# Patient Record
Sex: MALE | Race: WHITE | NOT HISPANIC OR LATINO | Employment: FULL TIME | URBAN - METROPOLITAN AREA
[De-identification: names, ages, dates, MRNs, and addresses within clinical notes are randomized per-mention and may not be internally consistent; named-entity substitution may affect disease eponyms.]

---

## 2017-03-28 ENCOUNTER — HOSPITAL ENCOUNTER (INPATIENT)
Facility: CLINIC | Age: 37
LOS: 2 days | Discharge: HOME | DRG: 603 | End: 2017-03-31
Attending: PHYSICIAN ASSISTANT | Admitting: INTERNAL MEDICINE
Payer: COMMERCIAL

## 2017-03-28 ENCOUNTER — APPOINTMENT (OUTPATIENT)
Dept: CT IMAGING | Facility: CLINIC | Age: 37
DRG: 603 | End: 2017-03-28
Attending: PHYSICIAN ASSISTANT
Payer: COMMERCIAL

## 2017-03-28 DIAGNOSIS — L03.115 CELLULITIS OF RIGHT LOWER EXTREMITY: ICD-10-CM

## 2017-03-28 DIAGNOSIS — R23.8 SKIN BREAKDOWN: ICD-10-CM

## 2017-03-28 DIAGNOSIS — B37.2 CANDIDIASIS OF SKIN: Primary | ICD-10-CM

## 2017-03-28 LAB
ALBUMIN SERPL-MCNC: 2.7 G/DL (ref 3.4–5)
ALP SERPL-CCNC: 89 U/L (ref 40–150)
ALT SERPL W P-5'-P-CCNC: 137 U/L (ref 0–70)
ANION GAP SERPL CALCULATED.3IONS-SCNC: 12 MMOL/L (ref 3–14)
AST SERPL W P-5'-P-CCNC: 286 U/L (ref 0–45)
BASOPHILS # BLD AUTO: 0 10E9/L (ref 0–0.2)
BASOPHILS NFR BLD AUTO: 0.1 %
BILIRUB SERPL-MCNC: 0.8 MG/DL (ref 0.2–1.3)
BUN SERPL-MCNC: 40 MG/DL (ref 7–30)
CALCIUM SERPL-MCNC: 8.9 MG/DL (ref 8.5–10.1)
CHLORIDE SERPL-SCNC: 97 MMOL/L (ref 94–109)
CO2 BLDCOV-SCNC: 22 MMOL/L (ref 21–28)
CO2 SERPL-SCNC: 23 MMOL/L (ref 20–32)
CREAT SERPL-MCNC: 1.89 MG/DL (ref 0.66–1.25)
DIFFERENTIAL METHOD BLD: ABNORMAL
EOSINOPHIL # BLD AUTO: 0 10E9/L (ref 0–0.7)
EOSINOPHIL NFR BLD AUTO: 0 %
ERYTHROCYTE [DISTWIDTH] IN BLOOD BY AUTOMATED COUNT: 16.9 % (ref 10–15)
GFR SERPL CREATININE-BSD FRML MDRD: 41 ML/MIN/1.7M2
GLUCOSE SERPL-MCNC: 139 MG/DL (ref 70–99)
HCT VFR BLD AUTO: 37.1 % (ref 40–53)
HGB BLD-MCNC: 12.6 G/DL (ref 13.3–17.7)
IMM GRANULOCYTES # BLD: 0.2 10E9/L (ref 0–0.4)
IMM GRANULOCYTES NFR BLD: 1.3 %
LACTATE BLD-SCNC: 1.7 MMOL/L (ref 0.7–2.1)
LYMPHOCYTES # BLD AUTO: 1.8 10E9/L (ref 0.8–5.3)
LYMPHOCYTES NFR BLD AUTO: 10.8 %
MCH RBC QN AUTO: 24.3 PG (ref 26.5–33)
MCHC RBC AUTO-ENTMCNC: 34 G/DL (ref 31.5–36.5)
MCV RBC AUTO: 72 FL (ref 78–100)
MONOCYTES # BLD AUTO: 1.1 10E9/L (ref 0–1.3)
MONOCYTES NFR BLD AUTO: 6.4 %
NEUTROPHILS # BLD AUTO: 13.3 10E9/L (ref 1.6–8.3)
NEUTROPHILS NFR BLD AUTO: 81.4 %
NRBC # BLD AUTO: 0 10*3/UL
NRBC BLD AUTO-RTO: 0 /100
PCO2 BLDV: 40 MM HG (ref 40–50)
PH BLDV: 7.36 PH (ref 7.32–7.43)
PLATELET # BLD AUTO: 173 10E9/L (ref 150–450)
PO2 BLDV: 43 MM HG (ref 25–47)
POTASSIUM SERPL-SCNC: 3.6 MMOL/L (ref 3.4–5.3)
PROT SERPL-MCNC: 8.3 G/DL (ref 6.8–8.8)
RBC # BLD AUTO: 5.19 10E12/L (ref 4.4–5.9)
SAO2 % BLDV FROM PO2: 77 %
SODIUM SERPL-SCNC: 132 MMOL/L (ref 133–144)
WBC # BLD AUTO: 16.3 10E9/L (ref 4–11)

## 2017-03-28 PROCEDURE — 83605 ASSAY OF LACTIC ACID: CPT

## 2017-03-28 PROCEDURE — 73700 CT LOWER EXTREMITY W/O DYE: CPT | Mod: RT

## 2017-03-28 PROCEDURE — 87186 SC STD MICRODIL/AGAR DIL: CPT | Performed by: PHYSICIAN ASSISTANT

## 2017-03-28 PROCEDURE — 82803 BLOOD GASES ANY COMBINATION: CPT

## 2017-03-28 PROCEDURE — 99285 EMERGENCY DEPT VISIT HI MDM: CPT | Mod: 25

## 2017-03-28 PROCEDURE — 87070 CULTURE OTHR SPECIMN AEROBIC: CPT | Performed by: PHYSICIAN ASSISTANT

## 2017-03-28 PROCEDURE — 96374 THER/PROPH/DIAG INJ IV PUSH: CPT

## 2017-03-28 PROCEDURE — 36415 COLL VENOUS BLD VENIPUNCTURE: CPT

## 2017-03-28 PROCEDURE — 85025 COMPLETE CBC W/AUTO DIFF WBC: CPT | Performed by: PHYSICIAN ASSISTANT

## 2017-03-28 PROCEDURE — 25000128 H RX IP 250 OP 636: Performed by: PHYSICIAN ASSISTANT

## 2017-03-28 PROCEDURE — 87077 CULTURE AEROBIC IDENTIFY: CPT | Performed by: PHYSICIAN ASSISTANT

## 2017-03-28 PROCEDURE — 80053 COMPREHEN METABOLIC PANEL: CPT | Performed by: PHYSICIAN ASSISTANT

## 2017-03-28 RX ORDER — PIPERACILLIN SODIUM, TAZOBACTAM SODIUM 3; .375 G/15ML; G/15ML
3.38 INJECTION, POWDER, LYOPHILIZED, FOR SOLUTION INTRAVENOUS ONCE
Status: COMPLETED | OUTPATIENT
Start: 2017-03-28 | End: 2017-03-29

## 2017-03-28 RX ADMIN — SODIUM CHLORIDE 1000 ML: 9 INJECTION, SOLUTION INTRAVENOUS at 22:42

## 2017-03-28 ASSESSMENT — ENCOUNTER SYMPTOMS: COLOR CHANGE: 1

## 2017-03-28 NOTE — IP AVS SNAPSHOT
[facility] [address] [facility]    [address] [place] [address]    Phone:  [phone]                                       After Visit Summary   3/28/2017    Drew Braun    MRN: [id number]           After Visit Summary Signature Page     I have received my discharge instructions, and my questions have been answered. I have discussed any challenges I see with this plan with the nurse or doctor.    ..........................................................................................................................................  Patient/Patient Representative Signature      ..........................................................................................................................................  Patient Representative Print Name and Relationship to Patient    ..................................................               ................................................  Date                                            Time    ..........................................................................................................................................  Reviewed by Signature/Title    ...................................................              ..............................................  Date                                                            Time

## 2017-03-28 NOTE — IP AVS SNAPSHOT
MRN:[id number]                      After Visit Summary   3/28/2017    Drew Braun    MRN: [id number]           Thank you!     Thank you for choosing [facility] for your care. Our goal is always to provide you with excellent care. Hearing back from our patients is one way we can continue to improve our services. Please take a few minutes to complete the written survey that you may receive in the mail after you visit with us. Thank you!        Patient Information     Date Of Birth          1980        About your hospital stay     You were admitted on:  March 29, 2017 You last received care in the:  [facility] [address] [facility]    You were discharged on:  March 31, 2017        Reason for your hospital stay       Further management of right leg skin breakdown and infection.                  Who to Call     For medical emergencies, please call 911.  For non-urgent questions about your medical care, please call your primary care provider or clinic, None          Attending Provider     Provider Specialty    London Mckinley MD Emergency Medicine    Manuela, Cindy Avalos PA-C Physician Assistant    Ryan Raygoza DO Internal Medicine       Primary Care Provider    None       No address on file        After Care Instructions     Activity       Your activity upon discharge: activity as tolerated            Diet       Follow this diet upon discharge: Low fat diet.            Wound care and dressings       Instructions to care for your wound at home: as directed by wound consult on this admission.                  Follow-up Appointments     Follow-up and recommended labs and tests        Follow up with PCP as arranged during your stay here. Recommend checking CBC and BMP.  Follow up with wound clinic for ongoing RLE wound cares.                  Your next 10 appointments already scheduled     Apr 06, 2017 11:00 AM CDT   Office Visit with Lucy Stoner MD  "  [facility]    [address] [facility] [address] [phone]           Bring a current list of meds and any records pertaining to this visit.  For Physicals, please bring immunization records and any forms needing to be filled out.  Please arrive 10 minutes early to complete paperwork.            Apr 17, 2017  9:30 AM CDT   New Visit with Tigre Antonio MD   [facility] [place] ([facility])    [address]  [address]  [facility] [id number] [phone]              Further instructions from your care team         Plan of care for wounds located on right lower leg:  Daily, with additional dressing changes as needed if they are loose or soiled:  1.  Remove old dressings and then shower, rinsing leg well.  (If not showering, just cleanse lower leg with soap and water, and go to step 2).   2.  Apply Sween 24 cream to intact and scabby skin on lower legs (daily), and apply Urea cream bilateral plantar feet/heels (twice daily).    3.  Ensure hands are clean, and spray open wounds with MicroKlenz wound cleanser; pat dry with clean gauze.  4.  Apply a thin layer of Silvadene cream to open wounds, and cover with non-stick vaseline gauze.  May be easiest to apply the cream to the dressing, then place it cream-side-down onto wound and smear it around a little to cover all open areas.  Currently it takes about 1/2 piece of the non-stick dressing on the front wound, and at least one full dressing cut in half and placed side by side on the back wound.  As wounds heal/dry up you should need less and less.   5.  Cover with absorbent dressings (ie. ABD pads, peripads, gauze, small diapers - whatever works and is available).   6.  Secure with roll gauze and/or 6\" ACE wrap.  If ACE wrap is used, monitor it throughout the day to ensure it is not bunching up or causing any problems.   7.  Elevate leg as much as " "possible.    *Follow-up at [facility] [place] in next couple of weeks.  Phone: [phone].     *Follow-up sooner (with your new primary doctor or at urgent care) if any acute signs of infection, ie. Increased pain, redness, swelling, odor, gunky drainage, fever, chills, malaise, etc.     Pending Results     Date and Time Order Name Status Description    3/28/2017 2354 Wound Culture Aerobic Bacterial Preliminary     3/28/2017 2340 Blood culture Preliminary     3/28/2017 2340 Blood culture Preliminary             Statement of Approval     Ordered          17 1413  I have reviewed and agree with all the recommendations and orders detailed in this document.  EFFECTIVE NOW     Approved and electronically signed by:  Selwyn Angel MD             Admission Information     Date & Time Provider Department Dept. Phone    3/28/2017 Ryan Raygoza DO [facility] [address] [facility] [phone]      Your Vitals Were     Blood Pressure Pulse Temperature Respirations Height Weight    124/80 (BP Location: Right arm) 80 98.2  F (36.8  C) (Oral) 18 1.93 m (6' 4\") 241.7 kg (532 lb 13.6 oz)    Pulse Oximetry BMI (Body Mass Index)                96% 64.86 kg/m2          [employer] Information     [employer] lets you send messages to your doctor, view your test results, renew your prescriptions, schedule appointments and more. To sign up, go to [email or web address] . Click on \"Log in\" on the left side of the screen, which will take you to the Welcome page. Then click on \"Sign up Now\" on the right side of the page.     You will be asked to enter the access code listed below, as well as some personal information. Please follow the directions to create your username and password.     Your access code is: [email or web address]  Expires: 2017  8:48 AM     Your access code will  in 90 days. If you need help or a new code, please call your [facility] or [phone].        Care EveryWhere " ID     This is your Care EveryWhere ID. This could be used by other organizations to access your [facility] medical records  [id number]           Review of your medicines      START taking        Dose / Directions    amoxicillin-clavulanate 500-125 MG per tablet   Commonly known as:  AUGMENTIN   Used for:  Cellulitis of right lower extremity        Dose:  1 tablet   Take 1 tablet by mouth 2 times daily   Quantity:  20 tablet   Refills:  0       miconazole 2 % powder   Commonly known as:  MICATIN; MICRO GUARD   Used for:  Candidiasis of skin        Apply under pannus after cleaning with soft disposable white washcloths and daphnie cream (NOT cloth washcloths, NOT antonino wipes)   Quantity:  30 g   Refills:  0         CONTINUE these medicines which have NOT CHANGED        Dose / Directions    multivitamin, therapeutic with minerals Tabs tablet        Dose:  1 tablet   Take 1 tablet by mouth daily   Refills:  0            Where to get your medicines      These medications were sent to [facility] - [address]  [address], [place] [address]     Phone:  [phone]     amoxicillin-clavulanate 500-125 MG per tablet    miconazole 2 % powder                Protect others around you: Learn how to safely use, store and throw away your medicines at www.disposemymeds.org.             Medication List: This is a list of all your medications and when to take them. Check marks below indicate your daily home schedule. Keep this list as a reference.      Medications           Morning Afternoon Evening Bedtime As Needed    amoxicillin-clavulanate 500-125 MG per tablet   Commonly known as:  AUGMENTIN   Take 1 tablet by mouth 2 times daily   Last time this was given:  3/31/17                                      miconazole 2 % powder   Commonly known as:  MICATIN; MICRO GUARD   Apply under pannus after cleaning with soft disposable white washcloths and daphnie cream (NOT cloth washcloths, NOT antonino  wipes)   Last time this was given:  3/31/2017  8:15 AM   Next Dose Due:  3/31/17                                         multivitamin, therapeutic with minerals Tabs tablet   Take 1 tablet by mouth daily   Next Dose Due:  3/31/17

## 2017-03-29 PROBLEM — L03.90 CELLULITIS: Status: ACTIVE | Noted: 2017-03-29

## 2017-03-29 LAB
ALBUMIN SERPL-MCNC: 2.4 G/DL (ref 3.4–5)
ALBUMIN UR-MCNC: 100 MG/DL
ALP SERPL-CCNC: 76 U/L (ref 40–150)
ALT SERPL W P-5'-P-CCNC: 108 U/L (ref 0–70)
ANION GAP SERPL CALCULATED.3IONS-SCNC: 12 MMOL/L (ref 3–14)
APPEARANCE UR: ABNORMAL
AST SERPL W P-5'-P-CCNC: 187 U/L (ref 0–45)
BACTERIA #/AREA URNS HPF: ABNORMAL /HPF
BILIRUB SERPL-MCNC: 0.7 MG/DL (ref 0.2–1.3)
BILIRUB UR QL STRIP: NEGATIVE
BUN SERPL-MCNC: 40 MG/DL (ref 7–30)
CALCIUM SERPL-MCNC: 8.3 MG/DL (ref 8.5–10.1)
CHLORIDE SERPL-SCNC: 98 MMOL/L (ref 94–109)
CO2 SERPL-SCNC: 22 MMOL/L (ref 20–32)
COLOR UR AUTO: YELLOW
CREAT SERPL-MCNC: 1.88 MG/DL (ref 0.66–1.25)
ERYTHROCYTE [DISTWIDTH] IN BLOOD BY AUTOMATED COUNT: 17.2 % (ref 10–15)
GFR SERPL CREATININE-BSD FRML MDRD: 41 ML/MIN/1.7M2
GLUCOSE BLDC GLUCOMTR-MCNC: 126 MG/DL (ref 70–99)
GLUCOSE BLDC GLUCOMTR-MCNC: 128 MG/DL (ref 70–99)
GLUCOSE BLDC GLUCOMTR-MCNC: 129 MG/DL (ref 70–99)
GLUCOSE BLDC GLUCOMTR-MCNC: 131 MG/DL (ref 70–99)
GLUCOSE SERPL-MCNC: 118 MG/DL (ref 70–99)
GLUCOSE UR STRIP-MCNC: NEGATIVE MG/DL
HBA1C MFR BLD: NORMAL % (ref 4.3–6)
HCT VFR BLD AUTO: 32.6 % (ref 40–53)
HGB BLD-MCNC: 10.9 G/DL (ref 13.3–17.7)
HGB UR QL STRIP: ABNORMAL
HYALINE CASTS #/AREA URNS LPF: 1 /LPF (ref 0–2)
KETONES UR STRIP-MCNC: NEGATIVE MG/DL
LEUKOCYTE ESTERASE UR QL STRIP: ABNORMAL
MCH RBC QN AUTO: 24 PG (ref 26.5–33)
MCHC RBC AUTO-ENTMCNC: 33.4 G/DL (ref 31.5–36.5)
MCV RBC AUTO: 72 FL (ref 78–100)
MUCOUS THREADS #/AREA URNS LPF: PRESENT /LPF
NITRATE UR QL: NEGATIVE
PH UR STRIP: 5.5 PH (ref 5–7)
PLATELET # BLD AUTO: 146 10E9/L (ref 150–450)
POTASSIUM SERPL-SCNC: 3.2 MMOL/L (ref 3.4–5.3)
PROT SERPL-MCNC: 7.3 G/DL (ref 6.8–8.8)
RBC # BLD AUTO: 4.55 10E12/L (ref 4.4–5.9)
RBC #/AREA URNS AUTO: 2 /HPF (ref 0–2)
SODIUM SERPL-SCNC: 132 MMOL/L (ref 133–144)
SP GR UR STRIP: 1.02 (ref 1–1.03)
SQUAMOUS #/AREA URNS AUTO: <1 /HPF (ref 0–1)
URN SPEC COLLECT METH UR: ABNORMAL
UROBILINOGEN UR STRIP-MCNC: 2 MG/DL (ref 0–2)
WBC # BLD AUTO: 14.5 10E9/L (ref 4–11)
WBC #/AREA URNS AUTO: 14 /HPF (ref 0–2)

## 2017-03-29 PROCEDURE — 80053 COMPREHEN METABOLIC PANEL: CPT | Performed by: INTERNAL MEDICINE

## 2017-03-29 PROCEDURE — 85027 COMPLETE CBC AUTOMATED: CPT | Performed by: INTERNAL MEDICINE

## 2017-03-29 PROCEDURE — 99254 IP/OBS CNSLTJ NEW/EST MOD 60: CPT | Performed by: SURGERY

## 2017-03-29 PROCEDURE — 99213 OFFICE O/P EST LOW 20 MIN: CPT

## 2017-03-29 PROCEDURE — 36415 COLL VENOUS BLD VENIPUNCTURE: CPT | Performed by: INTERNAL MEDICINE

## 2017-03-29 PROCEDURE — 81001 URINALYSIS AUTO W/SCOPE: CPT | Performed by: INTERNAL MEDICINE

## 2017-03-29 PROCEDURE — 12000000 ZZH R&B MED SURG/OB

## 2017-03-29 PROCEDURE — 87086 URINE CULTURE/COLONY COUNT: CPT | Performed by: INTERNAL MEDICINE

## 2017-03-29 PROCEDURE — 99223 1ST HOSP IP/OBS HIGH 75: CPT | Mod: AI | Performed by: INTERNAL MEDICINE

## 2017-03-29 PROCEDURE — 99207 ZZC APP CREDIT; MD BILLING SHARED VISIT: CPT | Performed by: INTERNAL MEDICINE

## 2017-03-29 PROCEDURE — 00000146 ZZHCL STATISTIC GLUCOSE BY METER IP

## 2017-03-29 PROCEDURE — 83036 HEMOGLOBIN GLYCOSYLATED A1C: CPT | Performed by: INTERNAL MEDICINE

## 2017-03-29 PROCEDURE — 25000128 H RX IP 250 OP 636: Performed by: PHYSICIAN ASSISTANT

## 2017-03-29 PROCEDURE — 25000132 ZZH RX MED GY IP 250 OP 250 PS 637: Performed by: INTERNAL MEDICINE

## 2017-03-29 PROCEDURE — 25000128 H RX IP 250 OP 636: Performed by: INTERNAL MEDICINE

## 2017-03-29 PROCEDURE — 87040 BLOOD CULTURE FOR BACTERIA: CPT | Performed by: PHYSICIAN ASSISTANT

## 2017-03-29 RX ORDER — HYDROMORPHONE HYDROCHLORIDE 1 MG/ML
.3-.5 INJECTION, SOLUTION INTRAMUSCULAR; INTRAVENOUS; SUBCUTANEOUS
Status: DISCONTINUED | OUTPATIENT
Start: 2017-03-29 | End: 2017-03-30

## 2017-03-29 RX ORDER — POTASSIUM CHLORIDE 1500 MG/1
40 TABLET, EXTENDED RELEASE ORAL ONCE
Status: COMPLETED | OUTPATIENT
Start: 2017-03-29 | End: 2017-03-29

## 2017-03-29 RX ORDER — PROCHLORPERAZINE 25 MG
25 SUPPOSITORY, RECTAL RECTAL EVERY 12 HOURS PRN
Status: DISCONTINUED | OUTPATIENT
Start: 2017-03-29 | End: 2017-03-31 | Stop reason: HOSPADM

## 2017-03-29 RX ORDER — NICOTINE POLACRILEX 4 MG
15-30 LOZENGE BUCCAL
Status: DISCONTINUED | OUTPATIENT
Start: 2017-03-29 | End: 2017-03-31 | Stop reason: HOSPADM

## 2017-03-29 RX ORDER — ONDANSETRON 2 MG/ML
4 INJECTION INTRAMUSCULAR; INTRAVENOUS EVERY 6 HOURS PRN
Status: DISCONTINUED | OUTPATIENT
Start: 2017-03-29 | End: 2017-03-31 | Stop reason: HOSPADM

## 2017-03-29 RX ORDER — DEXTROSE MONOHYDRATE 25 G/50ML
25-50 INJECTION, SOLUTION INTRAVENOUS
Status: DISCONTINUED | OUTPATIENT
Start: 2017-03-29 | End: 2017-03-31 | Stop reason: HOSPADM

## 2017-03-29 RX ORDER — ONDANSETRON 4 MG/1
4 TABLET, ORALLY DISINTEGRATING ORAL EVERY 6 HOURS PRN
Status: DISCONTINUED | OUTPATIENT
Start: 2017-03-29 | End: 2017-03-31 | Stop reason: HOSPADM

## 2017-03-29 RX ORDER — SODIUM CHLORIDE 9 MG/ML
INJECTION, SOLUTION INTRAVENOUS CONTINUOUS
Status: DISCONTINUED | OUTPATIENT
Start: 2017-03-29 | End: 2017-03-31

## 2017-03-29 RX ORDER — PROCHLORPERAZINE MALEATE 5 MG
5-10 TABLET ORAL EVERY 6 HOURS PRN
Status: DISCONTINUED | OUTPATIENT
Start: 2017-03-29 | End: 2017-03-31 | Stop reason: HOSPADM

## 2017-03-29 RX ORDER — ACETAMINOPHEN 650 MG/1
650 SUPPOSITORY RECTAL EVERY 4 HOURS PRN
Status: DISCONTINUED | OUTPATIENT
Start: 2017-03-29 | End: 2017-03-31 | Stop reason: HOSPADM

## 2017-03-29 RX ORDER — ACETAMINOPHEN 325 MG/1
650 TABLET ORAL EVERY 4 HOURS PRN
Status: DISCONTINUED | OUTPATIENT
Start: 2017-03-29 | End: 2017-03-31 | Stop reason: HOSPADM

## 2017-03-29 RX ORDER — BISACODYL 10 MG
10 SUPPOSITORY, RECTAL RECTAL DAILY PRN
Status: DISCONTINUED | OUTPATIENT
Start: 2017-03-29 | End: 2017-03-31 | Stop reason: HOSPADM

## 2017-03-29 RX ORDER — HYDROCODONE BITARTRATE AND ACETAMINOPHEN 5; 325 MG/1; MG/1
1-2 TABLET ORAL EVERY 4 HOURS PRN
Status: DISCONTINUED | OUTPATIENT
Start: 2017-03-29 | End: 2017-03-31 | Stop reason: HOSPADM

## 2017-03-29 RX ORDER — AMOXICILLIN 250 MG
1-2 CAPSULE ORAL 2 TIMES DAILY PRN
Status: DISCONTINUED | OUTPATIENT
Start: 2017-03-29 | End: 2017-03-31 | Stop reason: HOSPADM

## 2017-03-29 RX ORDER — PIPERACILLIN SODIUM, TAZOBACTAM SODIUM 3; .375 G/15ML; G/15ML
3.38 INJECTION, POWDER, LYOPHILIZED, FOR SOLUTION INTRAVENOUS EVERY 6 HOURS
Status: DISCONTINUED | OUTPATIENT
Start: 2017-03-29 | End: 2017-03-31 | Stop reason: HOSPADM

## 2017-03-29 RX ORDER — MULTIPLE VITAMINS W/ MINERALS TAB 9MG-400MCG
1 TAB ORAL DAILY
COMMUNITY

## 2017-03-29 RX ORDER — NALOXONE HYDROCHLORIDE 0.4 MG/ML
.1-.4 INJECTION, SOLUTION INTRAMUSCULAR; INTRAVENOUS; SUBCUTANEOUS
Status: DISCONTINUED | OUTPATIENT
Start: 2017-03-29 | End: 2017-03-31 | Stop reason: HOSPADM

## 2017-03-29 RX ORDER — UREA 8.5 G/85G
CREAM TOPICAL 2 TIMES DAILY PRN
Status: DISCONTINUED | OUTPATIENT
Start: 2017-03-29 | End: 2017-03-31 | Stop reason: HOSPADM

## 2017-03-29 RX ADMIN — MICONAZOLE NITRATE: 2 POWDER TOPICAL at 14:13

## 2017-03-29 RX ADMIN — MICONAZOLE NITRATE: 2 POWDER TOPICAL at 19:16

## 2017-03-29 RX ADMIN — PIPERACILLIN SODIUM,TAZOBACTAM SODIUM 3.38 G: 3; .375 INJECTION, POWDER, FOR SOLUTION INTRAVENOUS at 08:23

## 2017-03-29 RX ADMIN — MICONAZOLE NITRATE: 2 POWDER TOPICAL at 23:09

## 2017-03-29 RX ADMIN — MICONAZOLE NITRATE: 57 CREAM TOPICAL at 23:09

## 2017-03-29 RX ADMIN — MICONAZOLE NITRATE: 57 CREAM TOPICAL at 14:13

## 2017-03-29 RX ADMIN — POTASSIUM CHLORIDE 40 MEQ: 1500 TABLET, EXTENDED RELEASE ORAL at 09:51

## 2017-03-29 RX ADMIN — PIPERACILLIN SODIUM,TAZOBACTAM SODIUM 3.38 G: 3; .375 INJECTION, POWDER, FOR SOLUTION INTRAVENOUS at 19:19

## 2017-03-29 RX ADMIN — SODIUM CHLORIDE: 9 INJECTION, SOLUTION INTRAVENOUS at 02:28

## 2017-03-29 RX ADMIN — PIPERACILLIN SODIUM,TAZOBACTAM SODIUM 3.38 G: 3; .375 INJECTION, POWDER, FOR SOLUTION INTRAVENOUS at 01:22

## 2017-03-29 RX ADMIN — PIPERACILLIN SODIUM,TAZOBACTAM SODIUM 3.38 G: 3; .375 INJECTION, POWDER, FOR SOLUTION INTRAVENOUS at 14:17

## 2017-03-29 NOTE — CONSULTS
"[facility] General Surgery Consultation    Drew Braun MRN# [id number]   YOB: 1980 Age: 36 year old      Date of Admission:  3/28/2017  Date of Consult: 3/29/2017         Assessment and Plan:   Patient is a 36 year old male with severe cellulitis of his right lower extremity. He had a CT which does not show evidence of abscess or undrained fluid collection. The area on the anterior lower leg has patchy superficial necrosis of the skin which will slough. At this time no indication for operative debridement and would benefit from ongoing local wound cares.     PLAN:  Agree with consult WO  Aggressive local wound cares  Surgery will follow closely along to evaluate for abscess formation         Requesting Physician:      Dr. Raygoza        Chief Complaint:     Chief Complaint   Patient presents with     Leg Swelling     patient here with righ leg redness and swelling started yesterday          History of Present Illness:   Drew Braun is a 36 year old male who was seen in consultation at the request of Dr. Raygoza who presented with right lower leg pain, swelling and drainage. He reports that for the past day he has had increasing redness over his right lower leg. He also has had drainage and weeping of the skin on the anterior leg and posterior leg.  He has chronic lower leg edema. He has had prior RLE cellulitis which was treated as an outpatient with abx. He denies fevers, though his leg feels warm.           Physical Exam:   Blood pressure 108/64, pulse 88, temperature 98.1  F (36.7  C), temperature source Oral, resp. rate 21, height 6' 4\" (1.93 m), weight (!) 532 lb 13.6 oz (241.7 kg), SpO2 96 %.  532 lbs 13.63 oz  General: alert, no distress  Psych: Alert and Oriented.  Normal affect  Neurological: grossly intact  Eyes: Sclera clear  Respiratory:  nonlabored breathing  Cardiovascular:  Regular Rate and Rhythm   GI: soft, nt, nd   Lymphatic/Hematologic/Immune:  " No femoral or cervical lymphadenopathy.  Integumentary:  RLE: significant erythema and induration below the knee extending to above ankle. Area on anterior lower leg which measures approximately 12cm x10cm with patchy superficial skin necrosis and weeping with white exudate. No fluctuance but tissue macerated and edematous.  Area on posterior lower leg with smaller areas of superficial patchy necrosis, white exudate         Past Medical History:   History reviewed. No pertinent past medical history.         Past Surgical History:   History reviewed. No pertinent surgical history.         Current Medications:           piperacillin-tazobactam  3.375 g Intravenous Q6H     insulin aspart  1-7 Units Subcutaneous TID AC     insulin aspart  1-5 Units Subcutaneous At Bedtime     influenza quadrivalent (PF) vacc age 3 yrs and older  0.5 mL Intramuscular Prior to discharge     miconazole   Topical TID       naloxone, acetaminophen, acetaminophen, HYDROcodone-acetaminophen, HYDROmorphone, senna-docusate, magnesium hydroxide, bisacodyl, ondansetron **OR** ondansetron, prochlorperazine **OR** prochlorperazine **OR** prochlorperazine, glucose **OR** dextrose **OR** glucagon         Home Medications:     Prior to Admission medications    Medication Sig Last Dose Taking? Auth Provider   multivitamin, therapeutic with minerals (THERA-VIT-M) TABS tablet Take 1 tablet by mouth daily Past Week at Unknown time Yes Unknown, Entered By History            Allergies:   No Known Allergies         Family History:   reviewed        Social History:   Drew Braun  reports that he has never smoked. He does not have any smokeless tobacco history on file.          Review of Systems:   The 10 point Review of Systems is negative other than noted in the HPI.         Labs/Imaging   All new lab and imaging data was reviewed.   I have personally reviewed the imaging studies  .  TIME SPENT:  40 minutes was spent with patient and greater than  50% of the time was spent counseling and coordination of care.      Fior Leahy MD

## 2017-03-29 NOTE — ED PROVIDER NOTES
History     Chief Complaint:  Leg Swelling     HPI   Drew Braun is a 36 year old male who presents with right leg swelling and redness. The patient reports he has been having some ongoing swelling in the right leg, but yesterday he noticed new redness. The redness has been growing progressively worse. In addition, he has rashes along his right thigh and abdomen. Also has bruises along both arms, which he attributes to bumping into things as he has been getting over a recent cold. No recent travel.    Allergies:  The patient has no known drug allergies.    Medications:    The patient is currently on no regular medications.    Past Medical History:    History reviewed.  No significant past medical history.      Past Surgical History:    History reviewed.  No significant past surgical history.     Family History:    History reviewed.  No significant family history.    Social History:  Relationship status: Single  Tobacco use: Negative  The patient presents alone.     Review of Systems   Cardiovascular: Positive for leg swelling.   Skin: Positive for color change and rash.   All other systems reviewed and are negative.      Physical Exam   First Vitals:  BP: 142/80  Pulse: 103  Temp: 98  F (36.7  C)  Resp: 26  SpO2: 94 %    Physical Exam  General: Alert, interactive, morbidly obese male appears comfortable    Eye:  Pupils are equal, round, and reactive.     ENT:     No rhinorrhea.     Moist mucus membranes.    Neck: No rigidity.               Cardiac:  Regular rate and rhythm. S1, S2.  No murmurs, gallops, or rubs. Palpable right pedal pulse.     Pulmonary:  Clear to auscultation bilaterally.  No wheezes or crackles.             Non labored. No use of accessory muscles.     Musculoskeletal:  Freely moveable extremities    Skin:  Warm and dry. Bilateral lower legs have hyperpigmentation which appears chronic. Right lower leg appears twice the size and has weeping shin wound and posterior calf wound; there  is increased deep erythema of the right lower leg compared to left; no streaking up the leg, no creptius. Pannus fold and bilateral groins are brightly erythematous, moist and odorous with skin breakdown.     Neurologic:  Non-focal exam without asymmetric weakness or numbness.  GCS 15    Psychiatric:  Awake. Normal affect with appropriate interaction with examiner. The pt does not appear in pain.      Emergency Department Course     Imaging:  Radiographic findings were communicated with the patient who voiced understanding of the findings.    CT Tibia Fibula Right w/o Contrast  1. Nonspecific prominent edema within the fat of a swollen right lower leg.  2. No soft tissue gas or underlying fluid collection is visualized.   3. Several nonspecific punctate calcifications in the deep soft tissues of the anterior lower leg.      Laboratory:  2257: ISTAT gases lactate claudia POCT: pH 7.36, pCO2 40, pO2 43, HCO3 22, Lactate 1.7  CBC: WBC 16.3 (H), HGB 12.6 (L),   CMP: Na 132 (L), Glucose 139 (H), BUN 40 (H), GFR 41 (L), Albumin 2.7 (L),  (H),  (H), Creatinine 1.89 (H), o/w WNL    Interventions:  2242: Normal Saline, 1000 mL, IV  Zosyn, 3.375 g, IV      Emergency Department Course:  Nursing notes and vitals reviewed.  I performed an exam of the patient as documented above.  The above workup was undertaken.  2347: I rechecked the patient and discussed results.  0023: I discussed the patient with Dr. Raygoza of the hospitalist service.    Findings and plan explained to the Patient who consents to admission. Discussed the patient with Dr. Raygoza, who will admit the patient to a [facility] bed for further monitoring, evaluation, and treatment.       Impression & Plan      Medical Decision Making:  Drew Braun is a 36 year old male here for evaluation of right lower extremity edema,  And suspected infection. Patient is afebrile and well appearing, though he does have a very enlarged red, swollen right lower  leg, concerning for infection. He does have an elevated WBC count, and acute renal insufficiency. He is afebrile, and has normal lactic acid. I attempted to get a wound culture from some fluid draining from open wounds on extremity. I started the patient on zosyn, and felt this was broad spectrum and and good option   in the event there is underlying deep space infection. Given the severity of his symptoms, I opted to do a CT, as I believed this would provide quick and efficient evaluation regarding the depth of his infection, which shows no evidence of gas formation or osteomyelelitis. At this point, I believe patient needs to be admitted for further evaluation and treatment. The patient consents to admission.     Diagnosis:    ICD-10-CM    1. Cellulitis of right lower extremity L03.115    2. Skin breakdown L90.9     of the groin and abdominal fold     Disposition:  Admit to a [facility] bed under the care of Dr. Raygoza.     Andrea WALKER, am serving as a scribe on 3/28/2017 at 10:16 PM to personally document services performed by Cindy Roy PA, based on my observations and the provider's statements to me.     EMERGENCY DEPARTMENT       Cindy Roy PA-C  03/29/17 0125

## 2017-03-29 NOTE — ED NOTES
Bed: ED20  Expected date:   Expected time:   Means of arrival:   Comments:  Housekeeping clean- enteric iso

## 2017-03-29 NOTE — PHARMACY-ADMISSION MEDICATION HISTORY
Admission medication history interview status for the 3/28/2017  admission is complete. See EPIC admission navigator for prior to admission medications     Medication history source reliability:Good    Actions taken by pharmacist (provider contacted, etc):None     Additional medication history information not noted on PTA med list :None    Medication reconciliation/reorder completed by provider prior to medication history? No    Time spent in this activity: 5 minutes    Prior to Admission medications    Medication Sig Last Dose Taking? Auth Provider   multivitamin, therapeutic with minerals (THERA-VIT-M) TABS tablet Take 1 tablet by mouth daily Past Week at Unknown time Yes Unknown, Entered By History       Radha Laughlin, PharmD

## 2017-03-29 NOTE — PROGRESS NOTES
[facility]    Hospitalist Progress Note    Date of Service (when I saw the patient): 03/29/2017    Assessment & Plan   Mr. Braun is a 36-year-old male with a past medical history of morbid obesity who presents to the Emergency Department with concerns for right lower extremity swelling and cellulitis.     1. Right lower extremity cellulitis: The patient has a history of methicillin-sensitive Staphylococcus aureus back in 2015. A CT does not show any evidence of abscess or gas bubbles to suggest necrosis or gangrene.  - Zosyn (day 2)  -Bld cx pending, wound cx growing gram positive and gram negative organisms  - Surgery consult appreciated, no intervention  - Wound RN consult appreciated    2. Morbid obesity: BMI 65.  - Needs outpatient follow up for weight loss  - PT     3. Fungal infection of pannus:  - Appreciate wound consult, started on miconazole TID    4. LAURO likely from #1: The patient's admit creatinine is 1.89 and the last creatinine I have access to was normal at 0.9 in 2013.   - UA questionable for UTI, Ucx pending  - Zosyn as above  - IVF NS at 100/hr, recheck labs in AM    5. Mildly elevated ALT and AST: Unclear etiology and transaminase was normal about a decade ago, possibly related to fatty liver disease given morbid obesity. He is asymptomatic, no evidence of obstruction with normal alkaline phosphatase and bilirubin. He states only having 1-2 alcoholic beverages every few weeks.  - down trending, recheck in AM    6. Hypokalemia.  - replaced oral x 1 dose, recheck in AM    DVT Prophylaxis: Ambulate every shift  Code Status: Full Code    Disposition: Expected discharge in 1-2 days.    Selwyn Angel MD    Interval History   No new complaints. No fevers/chills/pain in RLE. Denies concerns for home safety despite living alone and admitting to occasional leg trauma. Denies significant alcohol use as outlined above.    -Data reviewed today: I reviewed all new labs and imaging  results over the last 24 hours. I personally reviewed no images or EKG's today.    Physical Exam   Temp: 98.1  F (36.7  C) Temp src: Oral BP: 108/64 Pulse: 88 Heart Rate: 95 Resp: 21 SpO2: 96 % O2 Device: None (Room air)    Vitals:    03/29/17 0145   Weight: (!) 241.7 kg (532 lb 13.6 oz)     Vital Signs with Ranges  Temp:  [98  F (36.7  C)-98.5  F (36.9  C)] 98.1  F (36.7  C)  Pulse:  [] 88  Heart Rate:  [] 95  Resp:  [20-26] 21  BP: (108-142)/(61-80) 108/64  SpO2:  [94 %-98 %] 96 %  I/O last 3 completed shifts:  In: 459 [I.V.:459]  Out: 500 [Urine:500]    Constitutional: Awake, alert, cooperative, no apparent distress, morbidly obese  Respiratory: Clear to auscultation bilaterally, no crackles or wheezing  Cardiovascular: Regular rate and rhythm, normal S1 and S2, and no murmur noted, distant sounds  GI: Normal bowel sounds, round abdomen, soft, non-distended, non-tender but difficult exam given habitus.  Skin/Integumen: Anterior and posterior tibial skin breakdown/slough with yellow drainage. Erythema extending up to knee, demarcated today with marker. Warmth appreciated up to medial thigh.    Medications     NaCl 100 mL/hr at 03/29/17 0228       piperacillin-tazobactam  3.375 g Intravenous Q6H     insulin aspart  1-7 Units Subcutaneous TID AC     insulin aspart  1-5 Units Subcutaneous At Bedtime     influenza quadrivalent (PF) vacc age 3 yrs and older  0.5 mL Intramuscular Prior to discharge     miconazole   Topical TID       Data     Recent Labs  Lab 03/29/17  0800 03/28/17  2245   WBC 14.5* 16.3*   HGB 10.9* 12.6*   MCV 72* 72*   * 173   * 132*   POTASSIUM 3.2* 3.6   CHLORIDE 98 97   CO2 22 23   BUN 40* 40*   CR 1.88* 1.89*   ANIONGAP 12 12   DEBI 8.3* 8.9   * 139*   ALBUMIN 2.4* 2.7*   PROTTOTAL 7.3 8.3   BILITOTAL 0.7 0.8   ALKPHOS 76 89   * 137*   * 286*       Recent Results (from the past 24 hour(s))   CT Tibia Fibula Right w/o Contrast    Narrative    CT TIBIA  FIBULA RIGHT WITHOUT CONTRAST March 28, 2017 11:33 PM     HISTORY: Leg swelling and concern for deep space infection.    TECHNIQUE: Axial images with reconstructions. Radiation dose for this  scan was reduced using automated exposure control, adjustment of the  mA and/or kV according to patient size, or iterative reconstruction  technique.      Preliminary report given by Dr. Hawkins at 2346 hours.      Impression    IMPRESSION: There is prominent subcutaneous and cutaneous edema and  thickening extending along the leg and ankle. This is nonspecific and  could relate to reactive or dependent edema, injury, or cellulitis.  There are some varicose veins. There is some scattered punctate  anterior subcutaneous calcifications. These are also present minimally  elsewhere and are nonspecific (possibly dystrophic calcification). No  focal intermuscular fluid collection is appreciated. No soft tissue  gas identified.

## 2017-03-29 NOTE — H&P
DATE OF ADMISSION:  03/29/2017.      PRIMARY CARE PHYSICIAN:  None currently.      CODE STATUS:  Full code.      CHIEF COMPLAINT:  Right lower extremity swelling and pain.      HISTORY OF PRESENT ILLNESS:  Mr. Drew Braun is a 36-year-old male without any significant past medical history who presents to the Emergency Department with the above concern.  History is obtained through discussion with the ED physician as well as the patient.  I also obtained the records from [facility] through Care Everywhere which I reviewed.  The patient notes that he has been having worsening right lower extremity swelling, pain and erythema over the past day.  The patient has had swelling dating back at least 1 year and states that he went to see a physician who told him that he had edema and that he should elevate his legs.  The swelling has been more chronic, though progressive somewhat recently.  In particular, the patient started developing more erythema and warmth over just the past 24 hours with some associated weeping and drainage.  He came in for evaluation today given that it was worsening.  He denies any substantial pain and states that otherwise feels normal.  He has not had any systemic symptoms of fevers or chills.  No chest discomfort, shortness of breath, abdominal pain.  He denies any trauma.  No areas of fluctuance that he is aware.  Left leg is normal.      I reviewed records through Care Everywhere and note that he presented to urgent care at the [facility] in 2015 where he was diagnosed with a right lower extremity cellulitis.  At that point, the wound culture came back with MSSA.  He had been prescribed Bactrim.  The patient does not recall necessarily having an infection.  No history of MRSA that I am seeing.  In the Emergency Department, the patient had a CT of the leg which showed evidence of edema but no evidence of gas or fluid collection.  He has been initiated on Zosyn.  Wound culture and  blood culture have been obtained.      PAST MEDICAL HISTORY:  Right lower extremity cellulitis in 2015 with MSSA.      MEDICATIONS:  The patient declines taking any regular medications.      SOCIAL HISTORY:  The patient works for [employer].  Denies any use of tobacco and rare alcohol use.      FAMILY HISTORY:  Both his parents are alive and healthy.      ALLERGIES:  No known drug allergies.      REVIEW OF SYSTEMS:  The complete review of systems reviewed and negative, save for the pertinent positives which are recorded in the HPI.      PHYSICAL EXAMINATION:   VITAL SIGNS:  Show blood pressure of 120/61, heart rate 102, respirations 22, satting 98% on room air, temperature of 98.5 degrees Fahrenheit.   GENERAL:  The patient is lying in bed and appears comfortable.  He is not ill-appearing.   HEENT:  Pupils equal, round, reactive to light, extraocular muscle function is intact.  No scleral icterus.  Oropharynx is clear.   NECK:  No lymphadenopathy or thyromegaly.   CARDIOVASCULAR:  Heart is regular rate and rhythm without any murmur, rub or gallop.   PULMONARY:  Lungs are clear to auscultation bilaterally without wheeze or rhonchi.   GASTROINTESTINAL:  Positive bowel sounds, soft, nontender and nondistended.   SKIN:  He has a fair amount of erythema on the right lower extremity with some areas of darkening surrounded by what to me feels like a bit of fluctuance with some serosanguineous drainage.  No definitive abscess and no purulent material is noted.  The overlying area is nontender to palpation.   PSYCHIATRIC:  Alert and oriented x3, normal affect.   NEUROLOGIC:  Cranial nerves II-XII are grossly intact without any focal deficits.      LABORATORY DATA:  WBC count 16.3, hemoglobin 12.6 and platelets of 173.  He does have a left shift.  Sodium 132, potassium 3.6, chloride 97, CO2 of 23, BUN 40, creatinine 1.89 with elevated ALT at 137 and AST at 286.  Lactic acid is normal at 1.7.  Glucose is 139.  Blood  cultures and wound cultures pending.      IMAGING:  CT of the tib-fib shows a preliminary report of edema within the fat of a swollen right lower leg with no soft tissue gas or fluid collection seen.      ASSESSMENT AND PLAN:  Mr. Mcfadden is a 36-year-old male with a past medical history of morbid obesity who presents to the Emergency Department with concerns for right lower extremity swelling and cellulitis.   1.  Right lower extremity cellulitis:  The patient has a history of methicillin-sensitive Staphylococcus aureus back in 2015.  A CT does not show any evidence of abscess or gas bubbles to suggest necrosis or gangrene.  I do feel what to me seems a bit of fluctuance more superficially with some serosanguineous drainage.  For now will continue with Zosyn, which had been initiated in the Emergency Department and will hold on vancomycin.  Will await wound and blood cultures.  I am going to ask Surgery to look at the wound and determine if there is any role for lancing or drainage.   2.  Morbid obesity:  Body mass index is not yet populated but will certainly be over 40.   3.  Skin breakdown and odor beneath his pannus:  I am going to ask the wound nurse to evaluate.   4.  Deep venous thrombosis prophylaxis:  Ambulation.   5.  Elevated creatinine:  The patient's creatinine is 1.89 and the last creatinine I have access to was normal at 0.9 in .  He does have an elevated BUN, so I suspect this is an acute kidney injury.  I am going to hydrate with normal saline, obtain a UA and recheck a renal function in the morning.   6.  Mildly elevated ALT and AST:  Unclear etiology and transaminase was normal about a decade ago.  He is asymptomatic, no evidence of obstruction with normal alkaline phosphatase and bilirubin.  Will recheck.         AINSLEY RUSSELL DO             D: 2017 02:05   T: 2017 03:08   MT: tra      Name:     JOMAR MCFADDEN   MRN:      [id number]        Account:      [id number]   :       1980           Admitted:     [id number]      Document: [id number]

## 2017-03-29 NOTE — PROGRESS NOTES
"Received call from lab:     Hgb A1C was checked as an add-on lab; result was 'below assay range,' meaning it was detected at \"less than 3.5.\"  Can be re-ordered at the discretion of the MD.    Flor FARAH   "

## 2017-03-29 NOTE — PROGRESS NOTES
1) Paged Hospitalist (Dr. Angel) to request K+ protocol.      2) Surgeon (Dr. Ayers) rounded, recommends ambulation (will add PT consult in case pt would benefit from assistive device), and a dressing that absorbs drainage (mepilex currently on, will await WOC-RN consult recommendations).     Flor FARAH

## 2017-03-29 NOTE — PLAN OF CARE
Problem: Infection, Risk/Actual (Adult)  Goal: Infection Prevention/Resolution  Patient will demonstrate the desired outcomes by discharge/transition of care.  Outcome: Improving   Pt alert, oriented.  Up with SBA, unsteady; PT consult added.  RLE red, marked--dressing change per WOC-RN orders, done on days, due on PM's.  Pannus and umbilicus with fungal infection.  Abnormal labs including LFTs and low K+ (one-time dose per MD given).  All blood sugars <140, A1c low, requested d/c of BGM checks.    Activity Level: SBA, unsteady  Fall Risk: MOD  Tests/Procedures for next shift: cont IV abx and wound care   Anticipated DC date: 3/31 at earliest

## 2017-03-29 NOTE — PLAN OF CARE
Problem: Goal Outcome Summary  Goal: Goal Outcome Summary  Outcome: No Change  Pt is alert and oriented x4. VSS on RA. Denies pain. LS clear. No SOB. Denies chest pain/dyspnea. Pt has anterior and posterior wounds to lower right leg. Severe edema to lower right leg. Wounds have some serosanguineous drainage, dressings applied. IVF at 100cc/hr. Pt is on IV Zosyn. . Surgery and wound ostomy consults today. Pt concerned about his weight, would want to talk to a doctor about it.

## 2017-03-29 NOTE — ED NOTES
[facility]  ED Nurse Handoff Report    ED Chief complaint: Leg Swelling (patient here with righ leg redness and swelling started yesterday)      ED Diagnosis:   Final diagnoses:   Cellulitis of right lower extremity   Skin breakdown - of the groin and abdominal fold       Code Status: tbd by hospitalist    Allergies: No Known Allergies    Activity level:  Independent     Needed?: No    Isolation: Yes  Infection: Not Applicable    Bariatric?: Yes      Vital Signs:   Vitals:    03/28/17 2132 03/28/17 2305   BP: 142/80 120/61   Pulse: 103    Resp: 26 22   Temp: 98  F (36.7  C)    TempSrc: Oral    SpO2: 94% 98%       Cardiac Rhythm: ,        Pain level:      Is this patient confused?: No    Patient Report: Initial Complaint: R lower leg wound check  Focused Assessment: R lower leg swelling and redness. Open wound. Diagnosed with cellulitis.  Tests Performed: labs, venous gas, wound and blood cultures  Abnormal Results: WBC 16.3  Treatments provided: IV Abx    Family Comments: alone    OBS brochure/video discussed/provided to patient: No    ED Medications:   Medications   piperacillin-tazobactam (ZOSYN) 3.375 g vial to attach to  mL bag (not administered)   0.9% sodium chloride BOLUS (1,000 mLs Intravenous New Bag 3/28/17 2242)       Drips infusing?:  Yes      ED NURSE PHONE NUMBER: [phone]

## 2017-03-29 NOTE — PROGRESS NOTES
[facility] Nurse Inpatient Wound Assessment     Initial Assessment of wound(s) on pt's:   RLE; pannus/groin folds        Data:   Patient History:      per MD note(s): CHIEF COMPLAINT: Right lower extremity swelling and pain.       HISTORY OF PRESENT ILLNESS: Mr. Drew Braun is a 36-year-old male without any significant past medical history who presents to the Emergency Department with the above concern. The patient notes that he has been having worsening right lower extremity swelling, pain and erythema over the past day. The patient has had swelling dating back at least 1 year and states that he went to see a physician who told him that he had edema and that he should elevate his legs. The swelling has been more chronic, though progressive somewhat recently. In particular, the patient started developing more erythema and warmth over just the past 24 hours with some associated weeping and drainage. He came in for evaluation today given that it was worsening. He denies any substantial pain and states that otherwise feels normal. He has not had any systemic symptoms of fevers or chills. No chest discomfort, shortness of breath, abdominal pain. He denies any trauma. No areas of fluctuance that he is aware. Left leg is normal.       I reviewed records through Care Everywhere and note that he presented to urgent care at the [facility] in 2015 where he was diagnosed with a right lower extremity cellulitis. At that point, the wound culture came back with MSSA. He had been prescribed Bactrim. The patient does not recall necessarily having an infection. No history of MRSA that I am seeing. In the Emergency Department, the patient had a CT of the leg which showed evidence of edema but no evidence of gas or fluid collection. He has been initiated on Zosyn. Wound culture and blood culture have been obtained.        Moisture Management: urinal, toilet      Current Diet / Nutrition:        Active Diet Order      Moderate Consistent CHO Diet             Simone Assessment and sub scores:   Simone Score  Av  Min: 20  Max: 20       Labs:       Recent Labs   Lab Test  17   0800   ALBUMIN  2.4*   HGB  10.9*   RBC  4.55   WBC  14.5*   PLT  146*   A1C  Canceled, Test credited   Below Assay Range        Low albumin    Wound Assessment (location):   RLE - anterior and medial/posterior   Wound History:  Pt states wounds just showed up a couple days ago, he is not able to identify any cause.  States has had on-going issues with edema but has never used compression.    Wound Base:   1.  Anterior: approx. 12 x 8 x 0.1+cm, mix of moist grey/white/pink/yellow soft spongy tissue.  Periwound has darker grey-pink lightly scabby tissue.    2.  Medial/posterior:  Approx. 15 x 15 x 0.1 cm, red moist denuded tissue, scattered thin pink-grey sloughing epidermal tissue.     Periwound Skin: large edema, deep erythema, and scattered maceration    Color: pink and red    Temperature  Normal to warm    Drainage:  Large, serosang    Odor: very mild    Pain:  minimal but stings with palpation/cleansing    Bilateral plantar feet:  Soles are thin and very cracked throughout; heels callousy with deep fissures; pt states are often very painful.  Has tried various OTC lotions but no prescription creams.     Pannus/groin folds:  Pt states usually takes very good care of skin but has not been able to do usual routine for past few days since having to come to hospital etc.  Pt has very large deep folds that are very moist and rashy, with a lot of scattered deep pink-red denuded tissue noted.  Satellite lesions present and foul odor.  Moderate discomfort.    Coccyx/buttocks also assessed today, are clear and intact.  Pt able to reposition independently but would benefit from larger bed.           Intervention:     Patient's chart evaluated.      Wound(s) assessed    Wound Care: RLE cleansed, moisturized; wounds cleansed and  covered with Aquacel Ag, ABDs, Kerlix, diaper.  Pannus folds cleansed.     Orders  Written    Supplies  Reviewed and Ordered: RN ordered antifungal powder; MD ordered Urea cream for feet    Discussed plan of care with Patient and Nurse          Assessment:       RLE:  Large weepy wounds to anterior and posterior medial leg, unclear origin, leg very inflamed and swollen.  Anterior wound somewhat soft/squishy but CT ruled out gas/fluid collection; Surgery has seen also and will be monitoring, no interventions planned as yet.  Posterior wound appears more shallow and stable.  Will dress wounds with Aquacel Ag to help with drainage and to provide topical antimicrobial.      Pannus/groin: severe fungal infection and shallow breakdown from the moisture and friction of folds.  Pt usually able to take good care of skin.  Should resolve quickly with antifungal powder and frequent hygiene.     Plantar feet:  Very cracked/fissured throughout, pt states has been a chronic, painful issue.  Will try Urea cream.  Question also Vitamin D deficiency or other nutritional issues.                 Plan:     Nursing to notify the Provider(s) and re-consult the [facility] Nurse if wound(s) deteriorate(s) or if the wound care plan needs reevaluation.      Plan of care for wounds located on RLE; skin care to BLE: Daily, and BID prn:   1.  Cleanse intact skin on lower legs and feet with bath wipes.  Use ceiling lift with leg strap behind ankle to help elevate leg for cares and assessment.   2.  Apply Sween 24 cream to intact and scabby skin on lower legs daily, and apply Urea cream (per MD order) to bilateral plantar feet/heels BID.    3.  Cleanse open wounds on RLE with MicroKlenz wound cleanser, pat dry.   4.  Cover wounds with Aquacel Ag.  Will need about 1 sheet on anterior leg, and 2 on posterior leg.  Can cut to fit as needed, as wounds become smaller/drier.  Try to only change the Aquacel once daily, unless super soaked then change BID prn.    5.  Cover with ABD pads, and secure with Kerlix and Medipore tape.   6.  Can also use trimmed yellow diaper wrapped around leg, either over or under the Kerlix, however works better.  Change prn.   7.  Label with time/date/initials.     8.  Elevate leg as much as possible, float heels in bed.       Plan for pannus/groin folds:  TID and prn:  1.  Cleanse well using Chema perineal lotion spray and disposable soft dry wipes ('Anuel white washcloths').  Do not use regular washcloths as they will be too abrasive.   Ensure cleaning to very base of all folds.  Pt can help hold up pannus.   2.  Apply antifungal powder to all folds and any rashy raw skin, ensuring gets into base of folds. Do not just 'dump' the powder in folds:  Rub the powder in lightly, and brush away all excess so there is just a thin even layer coating the skin.    3.  Place unfolded soft dry wipes into folds as needed to help with moisture/friction, do not bunch up.  Or can try pillow cases.   Do not use InterDry at this time, since the powder will block the effectiveness of the material.        [facility] Nurse will return: weekly and prn     Face to face time: 31-45 Minutes

## 2017-03-30 LAB
ALBUMIN SERPL-MCNC: 2.2 G/DL (ref 3.4–5)
ALP SERPL-CCNC: 66 U/L (ref 40–150)
ALT SERPL W P-5'-P-CCNC: 80 U/L (ref 0–70)
ANION GAP SERPL CALCULATED.3IONS-SCNC: 12 MMOL/L (ref 3–14)
AST SERPL W P-5'-P-CCNC: 95 U/L (ref 0–45)
BACTERIA SPEC CULT: NORMAL
BILIRUB SERPL-MCNC: 0.7 MG/DL (ref 0.2–1.3)
BUN SERPL-MCNC: 34 MG/DL (ref 7–30)
CALCIUM SERPL-MCNC: 8.2 MG/DL (ref 8.5–10.1)
CHLORIDE SERPL-SCNC: 101 MMOL/L (ref 94–109)
CO2 SERPL-SCNC: 23 MMOL/L (ref 20–32)
CREAT SERPL-MCNC: 1.62 MG/DL (ref 0.66–1.25)
ERYTHROCYTE [DISTWIDTH] IN BLOOD BY AUTOMATED COUNT: 17.5 % (ref 10–15)
GFR SERPL CREATININE-BSD FRML MDRD: 49 ML/MIN/1.7M2
GLUCOSE BLDC GLUCOMTR-MCNC: 106 MG/DL (ref 70–99)
GLUCOSE BLDC GLUCOMTR-MCNC: 110 MG/DL (ref 70–99)
GLUCOSE BLDC GLUCOMTR-MCNC: 116 MG/DL (ref 70–99)
GLUCOSE SERPL-MCNC: 125 MG/DL (ref 70–99)
HCT VFR BLD AUTO: 31.4 % (ref 40–53)
HGB BLD-MCNC: 10.3 G/DL (ref 13.3–17.7)
LACTATE BLD-SCNC: 0.7 MMOL/L (ref 0.7–2.1)
Lab: NORMAL
MCH RBC QN AUTO: 24 PG (ref 26.5–33)
MCHC RBC AUTO-ENTMCNC: 32.8 G/DL (ref 31.5–36.5)
MCV RBC AUTO: 73 FL (ref 78–100)
MICRO REPORT STATUS: NORMAL
PLATELET # BLD AUTO: 177 10E9/L (ref 150–450)
POTASSIUM SERPL-SCNC: 3.2 MMOL/L (ref 3.4–5.3)
POTASSIUM SERPL-SCNC: 3.4 MMOL/L (ref 3.4–5.3)
PROT SERPL-MCNC: 6.9 G/DL (ref 6.8–8.8)
RBC # BLD AUTO: 4.29 10E12/L (ref 4.4–5.9)
SODIUM SERPL-SCNC: 136 MMOL/L (ref 133–144)
SPECIMEN SOURCE: NORMAL
WBC # BLD AUTO: 12.9 10E9/L (ref 4–11)

## 2017-03-30 PROCEDURE — 36415 COLL VENOUS BLD VENIPUNCTURE: CPT | Performed by: INTERNAL MEDICINE

## 2017-03-30 PROCEDURE — 80053 COMPREHEN METABOLIC PANEL: CPT | Performed by: INTERNAL MEDICINE

## 2017-03-30 PROCEDURE — 84132 ASSAY OF SERUM POTASSIUM: CPT | Performed by: INTERNAL MEDICINE

## 2017-03-30 PROCEDURE — 85027 COMPLETE CBC AUTOMATED: CPT | Performed by: INTERNAL MEDICINE

## 2017-03-30 PROCEDURE — 25000128 H RX IP 250 OP 636: Performed by: INTERNAL MEDICINE

## 2017-03-30 PROCEDURE — 99232 SBSQ HOSP IP/OBS MODERATE 35: CPT | Performed by: INTERNAL MEDICINE

## 2017-03-30 PROCEDURE — 25000132 ZZH RX MED GY IP 250 OP 250 PS 637: Performed by: INTERNAL MEDICINE

## 2017-03-30 PROCEDURE — 90686 IIV4 VACC NO PRSV 0.5 ML IM: CPT | Performed by: INTERNAL MEDICINE

## 2017-03-30 PROCEDURE — 00000146 ZZHCL STATISTIC GLUCOSE BY METER IP

## 2017-03-30 PROCEDURE — 12000000 ZZH R&B MED SURG/OB

## 2017-03-30 PROCEDURE — 83605 ASSAY OF LACTIC ACID: CPT | Performed by: INTERNAL MEDICINE

## 2017-03-30 RX ORDER — POTASSIUM CHLORIDE 1500 MG/1
60 TABLET, EXTENDED RELEASE ORAL ONCE
Status: COMPLETED | OUTPATIENT
Start: 2017-03-30 | End: 2017-03-30

## 2017-03-30 RX ADMIN — SODIUM CHLORIDE: 9 INJECTION, SOLUTION INTRAVENOUS at 14:24

## 2017-03-30 RX ADMIN — MICONAZOLE NITRATE: 2 POWDER TOPICAL at 17:02

## 2017-03-30 RX ADMIN — MICONAZOLE NITRATE: 2 POWDER TOPICAL at 11:09

## 2017-03-30 RX ADMIN — PIPERACILLIN SODIUM,TAZOBACTAM SODIUM 3.38 G: 3; .375 INJECTION, POWDER, FOR SOLUTION INTRAVENOUS at 19:27

## 2017-03-30 RX ADMIN — SODIUM CHLORIDE: 9 INJECTION, SOLUTION INTRAVENOUS at 03:31

## 2017-03-30 RX ADMIN — PIPERACILLIN SODIUM,TAZOBACTAM SODIUM 3.38 G: 3; .375 INJECTION, POWDER, FOR SOLUTION INTRAVENOUS at 12:54

## 2017-03-30 RX ADMIN — POTASSIUM CHLORIDE 60 MEQ: 1500 TABLET, EXTENDED RELEASE ORAL at 11:10

## 2017-03-30 RX ADMIN — PIPERACILLIN SODIUM,TAZOBACTAM SODIUM 3.38 G: 3; .375 INJECTION, POWDER, FOR SOLUTION INTRAVENOUS at 01:01

## 2017-03-30 RX ADMIN — INFLUENZA A VIRUS A/CALIFORNIA/7/2009 X-179A (H1N1) ANTIGEN (FORMALDEHYDE INACTIVATED), INFLUENZA A VIRUS A/HONG KONG/4801/2014 X-263B (H3N2) ANTIGEN (FORMALDEHYDE INACTIVATED), INFLUENZA B VIRUS B/PHUKET/3073/2013 ANTIGEN (FORMALDEHYDE INACTIVATED), AND INFLUENZA B VIRUS B/BRISBANE/60/2008 ANTIGEN (FORMALDEHYDE INACTIVATED) 0.5 ML: 15; 15; 15; 15 INJECTION, SUSPENSION INTRAMUSCULAR at 20:25

## 2017-03-30 RX ADMIN — PIPERACILLIN SODIUM,TAZOBACTAM SODIUM 3.38 G: 3; .375 INJECTION, POWDER, FOR SOLUTION INTRAVENOUS at 06:37

## 2017-03-30 RX ADMIN — MICONAZOLE NITRATE: 2 POWDER TOPICAL at 20:45

## 2017-03-30 NOTE — CONSULTS
"BRIEF NUTRITION ASSESSMENT      REASON FOR ASSESSMENT:    Admission screen - Stageable pressure injuries or large/non-healing wound or burn      NUTRITION HISTORY:  Deferred    CURRENT DIET AND INTAKE:  Diet:  Mod consistent carb, good intake per chart.                ANTHROPOMETRICS:  Height: 6' 4\"  Weight: 241.7 kg  BMI: 64.86 kg/m2  IBW:  91.8 kg +/- 10%  Weight Status: Obesity Grade III BMI >40  %IBW: 263%    LABS:    A1C - not available    MALNUTRITION:  Patient does not meet two of the following criteria necessary for diagnosing malnutrition: significant weight loss, reduced intake, subcutaneous fat loss, muscle loss or fluid retention    NUTRITION INTERVENTION:  Nutrition Diagnosis:  No nutrition diagnosis at this time.    Implementation:  As pt does not have hx DM, recommend change to regular diet.  Nutrition Education:  Per Provider order if indicated    FOLLOW UP/MONITORING:   Will re-evaluate in 7 days, or sooner, if re-consulted.    Lola oSto RD  Pager [phone] (M-F)            [phone] (W/E & Hol)            "

## 2017-03-30 NOTE — PROGRESS NOTES
Met with patient regarding follow up needs. He needs a new pmd-would prefer [facility] . He is confident that he will be able to do his own wound care. He expects to d/c tomorrow.

## 2017-03-30 NOTE — PLAN OF CARE
Problem: Goal Outcome Summary  Goal: Goal Outcome Summary  PT: Orders received and chart reviewed. Patient is a 37 y/o male admitted for R LE cellulitis. Patient lives alone in a condominium with 1 step to enter/exit and 2 stairs to access kitchen. Patient reports completing functional mobility independently without use of an assistive device at baseline. Per discussion with nursing staff and patient, patient is completing functional mobility in room independently. Patient is agreeable to sit up in chair for all meals and ambulate in halls with nursing staff 2-3x/day. No IP PT needs are indicated. Will complete PT order.

## 2017-03-30 NOTE — PLAN OF CARE
Problem: Individualization  Goal: Patient Preferences  Outcome: Improving  Pt. A&O. VSS. No fever or chills. Indp. Wound care done per plan of care. Redness improving, moderate sangenous drainage from posterior calf wound. Potassium replacement given. To be redrawn this afternoon.

## 2017-03-30 NOTE — PROGRESS NOTES
"General Surgery Progress Note    Subjective: pt improving. No fevers.     Objective: /72 (BP Location: Left arm)  Pulse 80  Temp 96.7  F (35.9  C) (Oral)  Resp 18  Ht 6' 4\" (1.93 m)  Wt (!) 532 lb 13.6 oz (241.7 kg)  SpO2 94%  BMI 64.86 kg/m2  Gen: alert, no distress  CV: RRR  Pulm: nonlabored breathing  Abd: soft, nt, nd  Ext: RLE: erythema improving. Dressing adherent to anterior wound but no evidence fluctance.     Assessment/Plan:   Drew Braun  is a 36 year old male with cellulitis which is improving.   - continue Abx  - No need for surgical intervention at this time, we did discuss that occasionally cellulitis does progress to a focal fluid collection and surgery will continue to follow.     Fior Leahy MD  [facility]  [phone]              "

## 2017-03-30 NOTE — PLAN OF CARE
Problem: Goal Outcome Summary  Goal: Goal Outcome Summary  Outcome: No Change  Pt alert, oriented.  Up with SBA, unsteady; PT consult pending. RLE red, marked--dressing changedX1. Denies pain. Pannus/umbilicus/groin with fungal infection, antifungal appliedX2. good PO.K replaced at am, recheck tomorrow am. Cr elevated, IVF. On IV abx. Continue to monitor.

## 2017-03-30 NOTE — PLAN OF CARE
Problem: Goal Outcome Summary  Goal: Goal Outcome Summary  Outcome: No Change  A&Ox4. RLE dressing CDI. Significantly edematous compared to LLE. Redness resolving per marked line. Denies pain. Abd fold redness present. Sepsis protocol triggered r/t T:99.0F, R:22, WBC: 14.5. Lactic acid level drawn: 0.7. VS rechecked - remain unchanged. Light sheet and fan provided at bedside. Blood sugars monitored, 116 overnight. K+ replaced yesterday, anticipate recheck in AM. NS running at 100ml/h. Up SBA. Mod CCHO diet.

## 2017-03-31 VITALS
HEIGHT: 76 IN | BODY MASS INDEX: 38.36 KG/M2 | SYSTOLIC BLOOD PRESSURE: 124 MMHG | WEIGHT: 315 LBS | OXYGEN SATURATION: 96 % | HEART RATE: 80 BPM | DIASTOLIC BLOOD PRESSURE: 80 MMHG | TEMPERATURE: 98.2 F | RESPIRATION RATE: 18 BRPM

## 2017-03-31 LAB
ANION GAP SERPL CALCULATED.3IONS-SCNC: 9 MMOL/L (ref 3–14)
BUN SERPL-MCNC: 24 MG/DL (ref 7–30)
CALCIUM SERPL-MCNC: 8.5 MG/DL (ref 8.5–10.1)
CHLORIDE SERPL-SCNC: 105 MMOL/L (ref 94–109)
CO2 SERPL-SCNC: 24 MMOL/L (ref 20–32)
CREAT SERPL-MCNC: 1.23 MG/DL (ref 0.66–1.25)
ERYTHROCYTE [DISTWIDTH] IN BLOOD BY AUTOMATED COUNT: 18.3 % (ref 10–15)
GFR SERPL CREATININE-BSD FRML MDRD: 67 ML/MIN/1.7M2
GLUCOSE SERPL-MCNC: 97 MG/DL (ref 70–99)
HCT VFR BLD AUTO: 32.9 % (ref 40–53)
HGB BLD-MCNC: 10.6 G/DL (ref 13.3–17.7)
MCH RBC QN AUTO: 24 PG (ref 26.5–33)
MCHC RBC AUTO-ENTMCNC: 32.2 G/DL (ref 31.5–36.5)
MCV RBC AUTO: 75 FL (ref 78–100)
PLATELET # BLD AUTO: 232 10E9/L (ref 150–450)
POTASSIUM SERPL-SCNC: 3.7 MMOL/L (ref 3.4–5.3)
RBC # BLD AUTO: 4.41 10E12/L (ref 4.4–5.9)
SODIUM SERPL-SCNC: 138 MMOL/L (ref 133–144)
WBC # BLD AUTO: 13.1 10E9/L (ref 4–11)

## 2017-03-31 PROCEDURE — 99231 SBSQ HOSP IP/OBS SF/LOW 25: CPT | Performed by: SURGERY

## 2017-03-31 PROCEDURE — 40000901 ZZH STATISTIC WOC PT EDUCATION, 0-15 MIN

## 2017-03-31 PROCEDURE — 80048 BASIC METABOLIC PNL TOTAL CA: CPT | Performed by: INTERNAL MEDICINE

## 2017-03-31 PROCEDURE — 85027 COMPLETE CBC AUTOMATED: CPT | Performed by: INTERNAL MEDICINE

## 2017-03-31 PROCEDURE — 36415 COLL VENOUS BLD VENIPUNCTURE: CPT | Performed by: INTERNAL MEDICINE

## 2017-03-31 PROCEDURE — 99212 OFFICE O/P EST SF 10 MIN: CPT

## 2017-03-31 PROCEDURE — 25000128 H RX IP 250 OP 636: Performed by: INTERNAL MEDICINE

## 2017-03-31 PROCEDURE — 99239 HOSP IP/OBS DSCHRG MGMT >30: CPT | Performed by: INTERNAL MEDICINE

## 2017-03-31 RX ORDER — SILVER SULFADIAZINE 10 MG/G
CREAM TOPICAL DAILY
Status: DISCONTINUED | OUTPATIENT
Start: 2017-03-31 | End: 2017-03-31 | Stop reason: HOSPADM

## 2017-03-31 RX ORDER — AMOXICILLIN AND CLAVULANATE POTASSIUM 500; 125 MG/1; MG/1
1 TABLET, FILM COATED ORAL 2 TIMES DAILY
Qty: 20 TABLET | Refills: 0 | Status: SHIPPED | OUTPATIENT
Start: 2017-03-31 | End: 2017-05-08

## 2017-03-31 RX ADMIN — SODIUM CHLORIDE: 9 INJECTION, SOLUTION INTRAVENOUS at 01:53

## 2017-03-31 RX ADMIN — PIPERACILLIN SODIUM,TAZOBACTAM SODIUM 3.38 G: 3; .375 INJECTION, POWDER, FOR SOLUTION INTRAVENOUS at 12:54

## 2017-03-31 RX ADMIN — PIPERACILLIN SODIUM,TAZOBACTAM SODIUM 3.38 G: 3; .375 INJECTION, POWDER, FOR SOLUTION INTRAVENOUS at 01:53

## 2017-03-31 RX ADMIN — PIPERACILLIN SODIUM,TAZOBACTAM SODIUM 3.38 G: 3; .375 INJECTION, POWDER, FOR SOLUTION INTRAVENOUS at 08:15

## 2017-03-31 RX ADMIN — MICONAZOLE NITRATE: 2 POWDER TOPICAL at 08:15

## 2017-03-31 NOTE — PROGRESS NOTES
"Pt doing well. Pain improving in RLE. Afebrile    O: /79 (BP Location: Left arm)  Pulse 80  Temp 98  F (36.7  C) (Oral)  Resp 17  Ht 6' 4\" (1.93 m)  Wt (!) 532 lb 13.6 oz (241.7 kg)  SpO2 95%  BMI 64.86 kg/m2  RLE: improved erythema, anterior wound healing, no fluctuance    A/P: 36yom with cellulitis which is improving.   - Surgery will sign off, please call with further questions or concerns.     Fior Leahy MD  [facility]  [phone]      "

## 2017-03-31 NOTE — PLAN OF CARE
Problem: Goal Outcome Summary  Goal: Goal Outcome Summary   A&O x 4, up independent in the room, VSS, no c/o of pain, right  leg dressing changed by wound nurse good appetite, plan to d/c later  today with po abx.

## 2017-03-31 NOTE — PLAN OF CARE
Problem: Goal Outcome Summary  Goal: Goal Outcome Summary  Outcome: Improving  Pt alert, oriented. Up ind. RLE redness improving --dressing changedX1. Denies pain. Pannus/umbilicus/groin with fungal infection, antifungal appliedX2. good PO.K replaced at am, recheck tomorrow am. Cr elevated but better that yesterday, IVF. On IV abx. Continue to monitor.

## 2017-03-31 NOTE — DISCHARGE INSTRUCTIONS
"  Plan of care for wounds located on right lower leg:  Daily, with additional dressing changes as needed if they are loose or soiled:  1.  Remove old dressings and then shower, rinsing leg well.  (If not showering, just cleanse lower leg with soap and water, and go to step 2).   2.  Apply Sween 24 cream to intact and scabby skin on lower legs (daily), and apply Urea cream bilateral plantar feet/heels (twice daily).    3.  Ensure hands are clean, and spray open wounds with MicroKlenz wound cleanser; pat dry with clean gauze.  4.  Apply a thin layer of Silvadene cream to open wounds, and cover with non-stick vaseline gauze.  May be easiest to apply the cream to the dressing, then place it cream-side-down onto wound and smear it around a little to cover all open areas.  Currently it takes about 1/2 piece of the non-stick dressing on the front wound, and at least one full dressing cut in half and placed side by side on the back wound.  As wounds heal/dry up you should need less and less.   5.  Cover with absorbent dressings (ie. ABD pads, peripads, gauze, small diapers - whatever works and is available).   6.  Secure with roll gauze and/or 6\" ACE wrap.  If ACE wrap is used, monitor it throughout the day to ensure it is not bunching up or causing any problems.   7.  Elevate leg as much as possible.    *Follow-up at [facility] [place] in next couple of weeks.  Phone: [phone].     *Follow-up sooner (with your new primary doctor or at urgent care) if any acute signs of infection, ie. Increased pain, redness, swelling, odor, gunky drainage, fever, chills, malaise, etc.   "

## 2017-03-31 NOTE — PLAN OF CARE
Problem: Goal Outcome Summary  Goal: Goal Outcome Summary  Outcome: Improving  A/O x 4.  VSS on RA.  Afebrile.  RLE--dressing C/D/I; skin hot, red/alan, Lower extremity edema R > L.  IVF infusing.  Denies pain, SOB, nausea, or vomiting.  Independent.  Will continue to monitor.

## 2017-03-31 NOTE — DISCHARGE SUMMARY
[facility]    Discharge Summary  Hospitalist    Date of Admission:  3/28/2017  Date of Discharge:  3/31/2017  Discharging Provider: Selwyn Angel MD  Date of Service (when I saw the patient): 03/31/17    Discharge Diagnoses   RLE cellulitis and skin breakdown from MSSA and E.coli  Morbidly obese  Likely early venous insufficiency in lower extremities contributing to above  Candidiasis of abdominal pannus.  LAURO from above  Elevated transaminases from above  Hypokalemia    History of Present Illness   Please see admission H&P for full details.    Hospital Course   Drew Braun was admitted on 3/28/2017.  The following problems were addressed during his hospitalization:    1. Right lower extremity cellulitis: The patient has a history of methicillin-sensitive Staphylococcus aureus back in 2015. A CT does not show any evidence of abscess or gas bubbles to suggest necrosis or gangrene. Bld cx pending, wound cx growing MSSA and E.coli. Surgery consult appreciated, no intervention. Wound RN consult appreciated, kindly provided supplies for patient to take care of wounds at home. Patient declines home assistance for wound care. He was given 3 days of Zosyn and transitioned to augmentin on day 4 where he was discharged. 10 more days of antibiotics ordered given severity of wounds. Follow up with wound clinic and care coordinators have arranged for a new PCP.       2. Morbid obesity: BMI 65. Needs outpatient follow up for weight loss, PCP arranged by SW  - ambulating independently.     3. Fungal infection of pannus. Appreciate wound consult, started on miconazole TID (day 3). Follow up with wound clinic and PCP for further recommendations on treatment duration.      4. LAURO likely from #1: The patient's admit creatinine is 1.89 and the last creatinine I have access to was normal at 0.9 in 2013. Renal function improving with IVF. Voiding freely. UA questionable for UTI, Ucx NGTD.      5. Mildly  elevated LFT's likely due to infection as above. Transaminase was normal about a decade ago, possibly related to fatty liver disease given morbid obesity. He is asymptomatic, no evidence of obstruction with normal alkaline phosphatase and bilirubin. He states only having 1-2 alcoholic beverages every few weeks.  - trending down, will recommend follow up in clinic, likely component of fatty liver disease contributing.      6. Hypokalemia. Replaced orally without protocol given LAURO as above. Normal on recheck.    Active Problems:    Cellulitis    Selwyn Angel MD    Significant Results and Procedures   No procedures on this admission.    Pending Results   These results will be followed up by PCP.  Unresulted Labs Ordered in the Past 30 Days of this Admission     Date and Time Order Name Status Description    3/28/2017 2354 Wound Culture Aerobic Bacterial Preliminary     3/28/2017 2340 Blood culture Preliminary     3/28/2017 2340 Blood culture Preliminary           Code Status   Full Code       Primary Care Physician   None    Physical Exam   Temp: 98.2  F (36.8  C) Temp src: Oral BP: 124/80   Heart Rate: 83 Resp: 18 SpO2: 96 % O2 Device: None (Room air)    Vitals:    03/29/17 0145   Weight: (!) 241.7 kg (532 lb 13.6 oz)     Vital Signs with Ranges  Temp:  [97.9  F (36.6  C)-98.2  F (36.8  C)] 98.2  F (36.8  C)  Heart Rate:  [79-83] 83  Resp:  [17-18] 18  BP: (124-141)/(74-80) 124/80  SpO2:  [94 %-96 %] 96 %  I/O last 3 completed shifts:  In: 2621.67 [P.O.:1720; I.V.:901.67]  Out: -     Constitutional: Awake, alert, cooperative, no apparent distress, morbidly obese.  Respiratory: Clear to auscultation bilaterally, no crackles or wheezing  Cardiovascular: Regular rate and rhythm, normal S1 and S2, and no murmur noted  GI: Normal bowel sounds, soft, non-distended, non-tender, round abdomen and difficult exam.  Skin/Integumen: No edema. Skin breakdown over anterior and posterior RLE tibia healing well, decreased  serous drainage. No fluctuance noted. LLE unremarkable. Again noted are mildly dilated thigh veins b/l, early signs of venous insufficiency.    Discharge Disposition   Discharged to home  Condition at discharge: Satisfactory    Consultations This Hospital Stay   WOUND OSTOMY CONTINENCE NURSE  IP CONSULT  SURGERY GENERAL IP CONSULT  PHYSICAL THERAPY ADULT IP CONSULT    Time Spent on this Encounter   Selwyn WALKER, personally saw the patient today and spent greater than 30 minutes discharging this patient.    Discharge Orders     Reason for your hospital stay   Further management of right leg skin breakdown and infection.     Follow-up and recommended labs and tests    Follow up with PCP as arranged during your stay here. Recommend checking CBC and BMP.  Follow up with wound clinic for ongoing RLE wound cares.     Activity   Your activity upon discharge: activity as tolerated     Wound care and dressings   Instructions to care for your wound at home: as directed by wound consult on this admission.     Full Code     Diet   Follow this diet upon discharge: Low fat diet.       Discharge Medications   Current Discharge Medication List      START taking these medications    Details   miconazole (MICATIN; MICRO GUARD) 2 % powder Apply under pannus after cleaning with soft disposable white washcloths and daphnie cream (NOT cloth washcloths, NOT antonino wipes)  Qty: 30 g, Refills: 0    Associated Diagnoses: Candidiasis of skin      amoxicillin-clavulanate (AUGMENTIN) 500-125 MG per tablet Take 1 tablet by mouth 2 times daily  Qty: 20 tablet, Refills: 0    Associated Diagnoses: Cellulitis of right lower extremity         CONTINUE these medications which have NOT CHANGED    Details   multivitamin, therapeutic with minerals (THERA-VIT-M) TABS tablet Take 1 tablet by mouth daily           Allergies   No Known Allergies  Data   Most Recent 3 CBC's:  Recent Labs   Lab Test  03/31/17   1205  03/30/17   0805  03/29/17   0800   WBC   13.1*  12.9*  14.5*   HGB  10.6*  10.3*  10.9*   MCV  75*  73*  72*   PLT  232  177  146*      Most Recent 3 BMP's:  Recent Labs   Lab Test  03/31/17   1205  03/30/17   1440  03/30/17   0805  03/29/17   0800   NA  138   --   136  132*   POTASSIUM  3.7  3.4  3.2*  3.2*   CHLORIDE  105   --   101  98   CO2  24   --   23  22   BUN  24   --   34*  40*   CR  1.23   --   1.62*  1.88*   ANIONGAP  9   --   12  12   DEBI  8.5   --   8.2*  8.3*   GLC  97   --   125*  118*     Most Recent 2 LFT's:  Recent Labs   Lab Test  03/30/17   0805  03/29/17   0800   AST  95*  187*   ALT  80*  108*   ALKPHOS  66  76   BILITOTAL  0.7  0.7     Most Recent INR's and Anticoagulation Dosing History:  Anticoagulation Dose History     There is no flowsheet data to display.        Most Recent 3 Troponin's:No lab results found.  Most Recent Cholesterol Panel:No lab results found.  Most Recent 6 Bacteria Isolates From Any Culture (See EPIC Reports for Culture Details):  Recent Labs   Lab Test  03/29/17   1135  03/29/17   0121  03/29/17   0030  03/28/17   2347   CULT  <10,000 colonies/mL mixed urogenital josé antonio  Susceptibility testing not routinely done    No growth after 2 days  No growth after 2 days  Heavy growth Beta hemolytic Streptococcus group A Susceptibility testing not   routinely done  Moderate growth Escherichia coli  Heavy growth Staphylococcus aureus  *     Most Recent TSH, T4 and A1c Labs:  Recent Labs   Lab Test  03/29/17   0800   A1C  Canceled, Test credited   Below Assay Range       Results for orders placed or performed during the hospital encounter of 03/28/17   CT Tibia Fibula Right w/o Contrast    Narrative    CT TIBIA FIBULA RIGHT WITHOUT CONTRAST March 28, 2017 11:33 PM     HISTORY: Leg swelling and concern for deep space infection.    TECHNIQUE: Axial images with reconstructions. Radiation dose for this  scan was reduced using automated exposure control, adjustment of the  mA and/or kV according to patient size, or iterative  reconstruction  technique.      Preliminary report given by Dr. Hawkins at 2346 hours.      Impression    IMPRESSION: There is prominent subcutaneous and cutaneous edema and  thickening extending along the leg and ankle. This is nonspecific and  could relate to reactive or dependent edema, injury, or cellulitis.  There are some varicose veins. There is some scattered punctate  anterior subcutaneous calcifications. These are also present minimally  elsewhere and are nonspecific (possibly dystrophic calcification). No  focal intermuscular fluid collection is appreciated. No soft tissue  gas identified.    YSABEL FRAZIER MD

## 2017-03-31 NOTE — PROGRESS NOTES
[facility] Nurse Inpatient Wound Assessment     Follow-up Assessment of wound(s) on pt's:   RLE; pannus/groin folds        Data:   Patient History:      per MD note(s): CHIEF COMPLAINT: Right lower extremity swelling and pain.       HISTORY OF PRESENT ILLNESS: Mr. Drew Braun is a 36-year-old male without any significant past medical history who presents to the Emergency Department with the above concern. The patient notes that he has been having worsening right lower extremity swelling, pain and erythema over the past day. The patient has had swelling dating back at least 1 year and states that he went to see a physician who told him that he had edema and that he should elevate his legs. The swelling has been more chronic, though progressive somewhat recently. In particular, the patient started developing more erythema and warmth over just the past 24 hours with some associated weeping and drainage. He came in for evaluation today given that it was worsening. He denies any substantial pain and states that otherwise feels normal. He has not had any systemic symptoms of fevers or chills. No chest discomfort, shortness of breath, abdominal pain. He denies any trauma. No areas of fluctuance that he is aware. Left leg is normal.           Moisture Management: urinal, toilet      Current Diet / Nutrition:       Active Diet Order      Moderate Consistent CHO Diet             Simone Assessment and sub scores:   Simone Score  Av  Min: 20  Max: 20       Labs:    Recent Labs   Lab Test  17   0805  17   0800   ALBUMIN  2.2*  2.4*   HGB  10.3*  10.9*   WBC  12.9*  14.5*   A1C   --   Canceled, Test credited   Below Assay Range       Low albumin    Wound Assessment (location):   RLE - anterior and medial/posterior   Wound History:  Pt states wounds just showed up a few days ago, he is not able to identify any cause.  May have been present longer but pt was unaware. States has had  on-going issues with edema but has never used compression.    Wound Base:   1.  Anterior: approx. 12 x 8 x 0.1+cm, mix of moist pink tissue and grey/white/yellow soft spongy adherent sloughy tissue.  Periwound has dark grey lightly scabby tissue.    2.  Medial/posterior:  Approx. 15 x 15 x 0.1 cm, red and yellow moist denuded tissue, scattered thin pink-grey sloughing epidermal tissue.     Periwound Skin: edema, improving erythema, and improving maceration    Color: pink and red    Temperature  Normal to warm    Drainage:  moderate serosang, decreasing    Odor: very mild    Pain:  minimal but stings with palpation/cleansing    Bilateral plantar feet:  Soles are thin and very cracked throughout; heels callousy with deep fissures; pt states are often very painful.  Has tried various OTC lotions but no prescription creams.  Urea cream ordered here in hospital.  Slight improvement.     Pannus/groin folds:  (previous assessement 3-29)  Pt states usually takes very good care of skin but has not been able to do usual routine for past few days since having to come to hospital etc.  Pt has very large deep folds that are very moist and rashy, with a lot of scattered deep pink-red denuded tissue noted.  Satellite lesions present and foul odor.  Moderate discomfort.  Pt states areas healing well.            Intervention:     Patient's chart evaluated.      Wound(s) assessed    Wound Care: RLE cleansed, moisturized; wounds cleansed and covered with Aquacel Ag, ABDs, Kerlix, diaper.     Orders  Updated; d/c orders placed in AVS    Supplies  Reviewed and Ordered: ordered Silvadene cream per MD ok    Discussed plan of care with Patient Nurse and Dr. Angel and CC          Assessment:             RLE:  Large wounds to anterior and posterior/medial leg from unclear origin. Surgery was following but has signed off as wounds appear stable and slightly improved.  Pt planning to d/c home today on oral abx and doing his own wound cares.  " Will change POC to Silvadene cream on d/c, since pt would have a hard time getting supplies of things like Aquacel Ag.  Silvadene will also help debride wounds and will provide antimicrobial action.  Pt states will be able to obtain cover dressings. Wounds are still quite extensive and will need close follow-up; anterior wound will likely need some sharp debridement at some point, especially if becomes more necrotic/sloughy.  Recommend pt be seen at [facility] [place] in 1-2 weeks.       Pannus/groin: severe fungal infection and shallow breakdown from the moisture and friction of folds which is quickly improving.      Plantar feet:  Very cracked/fissured throughout, pt states has been a chronic, painful issue.  Will try Urea cream.  Question also Vitamin D deficiency or other nutritional issues.                 Plan:     Nursing to notify the Provider(s) and re-consult the [facility] Nurse if wound(s) deteriorate(s) or if the wound care plan needs reevaluation.      Pt to follow-up at [facility] wound clinic on d/c.       Plan of care for wounds located on right lower leg:  Daily, with additional dressing changes as needed if are loose or soiled:  1.  Cleanse intact skin on lower legs and feet with bath wipes.  Use ceiling lift with leg strap behind ankle to help elevate leg for cares and assessment.   2.  Apply Sween 24 cream to intact and scabby skin on lower legs daily, and apply Urea cream (per MD order) to bilateral plantar feet/heels BID.    3.  Cleanse open wounds on RLE with MicroKlenz wound cleanser, pat dry.   4.  Cover wounds with Aquacel Ag.  Will need about 1 sheet on anterior leg, and 2 on posterior leg.  Can cut to fit as needed, as wounds become smaller/drier.  Try to only change the Aquacel once daily, unless super soaked then change BID prn.   5.  Cover with ABD pads, and secure with Kerlix and Medipore tape.   6.  May apply light 6\" ACE wrap to help hold dressing in place, monitor wrap " frequently to ensure not bunching up or causing any problems.   7.  Label with time/date/initials.     8.  Elevate leg as much as possible, float heels in bed.       Plan for pannus/groin folds:  TID and prn:  1.  Cleanse well using Chema perineal lotion spray and disposable soft dry wipes ('Anuel white washcloths').  Do not use regular washcloths as they will be too abrasive.   Ensure cleaning to very base of all folds.  Pt can help hold up pannus.   2.  Apply antifungal powder to all folds and any rashy raw skin, ensuring gets into base of folds. Do not just 'dump' the powder in folds:  Rub the powder in lightly, and brush away all excess so there is just a thin even layer coating the skin.    3.  Place unfolded soft dry wipes into folds as needed to help with moisture/friction, do not bunch up.  Or can try pillow cases.   Do not use InterDry at this time, since the powder will block the effectiveness of the material.        WOC Nurse will return: weekly and prn     Face to face time: 30 Minutes assess and treat; 15 min additional pt education

## 2017-04-03 ENCOUNTER — TELEPHONE (OUTPATIENT)
Dept: OTHER | Facility: CLINIC | Age: 37
End: 2017-04-03

## 2017-04-03 NOTE — TELEPHONE ENCOUNTER
Called patient and he had no questions. Feels LE wounds are healing. Has f/u with PCP on 4/7 and wound clinic on 4/17. Directed him to reach out to his PCP with any concerns going forward.

## 2017-04-04 LAB
BACTERIA SPEC CULT: NO GROWTH
BACTERIA SPEC CULT: NO GROWTH
Lab: NORMAL
Lab: NORMAL
MICRO REPORT STATUS: NORMAL
MICRO REPORT STATUS: NORMAL
SPECIMEN SOURCE: NORMAL
SPECIMEN SOURCE: NORMAL

## 2017-04-05 LAB
BACTERIA SPEC CULT: ABNORMAL
Lab: ABNORMAL
MICRO REPORT STATUS: ABNORMAL
MICROORGANISM SPEC CULT: ABNORMAL
MICROORGANISM SPEC CULT: ABNORMAL
SPECIMEN SOURCE: ABNORMAL

## 2017-04-06 ENCOUNTER — OFFICE VISIT (OUTPATIENT)
Dept: FAMILY MEDICINE | Facility: CLINIC | Age: 37
End: 2017-04-06
Payer: COMMERCIAL

## 2017-04-06 VITALS
HEART RATE: 100 BPM | DIASTOLIC BLOOD PRESSURE: 90 MMHG | WEIGHT: 315 LBS | TEMPERATURE: 98 F | BODY MASS INDEX: 64.76 KG/M2 | SYSTOLIC BLOOD PRESSURE: 140 MMHG

## 2017-04-06 DIAGNOSIS — B36.9 CUTANEOUS FUNGAL INFECTION: ICD-10-CM

## 2017-04-06 DIAGNOSIS — I10 BENIGN ESSENTIAL HYPERTENSION: ICD-10-CM

## 2017-04-06 DIAGNOSIS — D64.9 ANEMIA, UNSPECIFIED TYPE: ICD-10-CM

## 2017-04-06 DIAGNOSIS — E66.01 OBESITY, MORBID, BMI 50 OR HIGHER (H): ICD-10-CM

## 2017-04-06 DIAGNOSIS — N17.9 ACUTE KIDNEY INJURY (H): ICD-10-CM

## 2017-04-06 DIAGNOSIS — L03.115 CELLULITIS OF RIGHT LOWER EXTREMITY: Primary | ICD-10-CM

## 2017-04-06 LAB
ERYTHROCYTE [DISTWIDTH] IN BLOOD BY AUTOMATED COUNT: 19.1 % (ref 10–15)
HBA1C MFR BLD: 5.5 % (ref 4.3–6)
HCT VFR BLD AUTO: 35.9 % (ref 40–53)
HGB BLD-MCNC: 11 G/DL (ref 13.3–17.7)
MCH RBC QN AUTO: 24 PG (ref 26.5–33)
MCHC RBC AUTO-ENTMCNC: 30.6 G/DL (ref 31.5–36.5)
MCV RBC AUTO: 78 FL (ref 78–100)
PLATELET # BLD AUTO: 316 10E9/L (ref 150–450)
RBC # BLD AUTO: 4.59 10E12/L (ref 4.4–5.9)
WBC # BLD AUTO: 7.3 10E9/L (ref 4–11)

## 2017-04-06 PROCEDURE — 80053 COMPREHEN METABOLIC PANEL: CPT | Performed by: INTERNAL MEDICINE

## 2017-04-06 PROCEDURE — 83036 HEMOGLOBIN GLYCOSYLATED A1C: CPT | Performed by: INTERNAL MEDICINE

## 2017-04-06 PROCEDURE — 99495 TRANSJ CARE MGMT MOD F2F 14D: CPT | Performed by: INTERNAL MEDICINE

## 2017-04-06 PROCEDURE — 36415 COLL VENOUS BLD VENIPUNCTURE: CPT | Performed by: INTERNAL MEDICINE

## 2017-04-06 PROCEDURE — 85027 COMPLETE CBC AUTOMATED: CPT | Performed by: INTERNAL MEDICINE

## 2017-04-06 PROCEDURE — 83540 ASSAY OF IRON: CPT | Performed by: INTERNAL MEDICINE

## 2017-04-06 NOTE — MR AVS SNAPSHOT
"              After Visit Summary   4/6/2017    Drew Braun    MRN: [id number]           Patient Information     Date Of Birth          1980        Visit Information        Provider Department      4/6/2017 11:00 AM Lucy Stoner MD [facility] [place]        Today's Diagnoses     Anemia, unspecified type    -  1    Acute kidney injury (H)        Obesity, morbid, BMI 50 or higher (H)           Follow-ups after your visit        Follow-up notes from your care team     Return in about 1 month (around 5/6/2017) for HTN.      Your next 10 appointments already scheduled     Apr 17, 2017  9:30 AM CDT   New Visit with Tigre Antonio MD   [facility] [place] ([facility])    [address] [address] [phone]              Who to contact     If you have questions or need follow up information about today's clinic visit or your schedule please contact [facility] directly at [phone].  Normal or non-critical lab and imaging results will be communicated to you by Darby Smarthart, letter or phone within 4 business days after the clinic has received the results. If you do not hear from us within 7 days, please contact the clinic through Darby Smarthart or phone. If you have a critical or abnormal lab result, we will notify you by phone as soon as possible.  Submit refill requests through Jumo or call your pharmacy and they will forward the refill request to us. Please allow 3 business days for your refill to be completed.          Additional Information About Your Visit        MyChart Information     Jumo lets you send messages to your doctor, view your test results, renew your prescriptions, schedule appointments and more. To sign up, go to [email or web address] . Click on \"Log in\" on the left side of the screen, which will take you to the Welcome page. Then click on \"Sign up Now\" on the right side of " the page.     You will be asked to enter the access code listed below, as well as some personal information. Please follow the directions to create your username and password.     Your access code is: [email or web address]  Expires: 2017  8:48 AM     Your access code will  in 90 days. If you need help or a new code, please call your [facility] clinic or [phone].        Care EveryWhere ID     This is your Care EveryWhere ID. This could be used by other organizations to access your [facility] medical records  [id number]        Your Vitals Were     Pulse Temperature BMI (Body Mass Index)             100 98  F (36.7  C) (Tympanic) 64.76 kg/m2          Blood Pressure from Last 3 Encounters:   17 140/90   17 124/80    Weight from Last 3 Encounters:   17 (!) 532 lb (241.3 kg)   17 (!) 532 lb 13.6 oz (241.7 kg)              We Performed the Following     CBC with platelets     Comprehensive metabolic panel (BMP + Alb, Alk Phos, ALT, AST, Total. Bili, TP)     Hemoglobin A1c     Iron        Primary Care Provider    None       No address on file        Thank you!     Thank you for choosing [facility]  for your care. Our goal is always to provide you with excellent care. Hearing back from our patients is one way we can continue to improve our services. Please take a few minutes to complete the written survey that you may receive in the mail after your visit with us. Thank you!             Your Updated Medication List - Protect others around you: Learn how to safely use, store and throw away your medicines at www.disposemymeds.org.          This list is accurate as of: 17 11:30 AM.  Always use your most recent med list.                   Brand Name Dispense Instructions for use    amoxicillin-clavulanate 500-125 MG per tablet    AUGMENTIN    20 tablet    Take 1 tablet by mouth 2 times daily       miconazole 2 % powder    MICATIN; MICRO GUARD    30 g    Apply under pannus after  cleaning with soft disposable white washcloths and daphnie cream (NOT cloth washcloths, NOT antonino wipes)       multivitamin, therapeutic with minerals Tabs tablet      Take 1 tablet by mouth daily

## 2017-04-06 NOTE — PROGRESS NOTES
SUBJECTIVE:                                                    Drew Braun is a 36 year old male who presents to clinic today for the following health issues:    Drew lives on his own.  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        Hospital Follow-up Visit:    Hospital/Nursing Home/IP Rehab Facility: [facility]  Date of Admission: 3/28  Date of Discharge: 3/31  Reason(s) for Admission: Cellulitis             Problems taking medications regularly:  None       Medication changes since discharge: augmentin and miconazole        Problems adhering to non-medication therapy:  None    Summary of hospitalization:  [facility] discharge summary reviewed. Drew was admitted 3/28 with RLE cellulitis and associated LAURO. He was treated with IV zosyn and was discharged on augmentin to complete a total 14 day course. Creatinine 1.9 on admisssion, down to 1.2 on discharge. Also had mildly elevated liver enzymes which downtrended as well.  He is using miconazole powder for fungal rash of pannus and that is improving as well.       Diagnostic Tests/Treatments reviewed.  Follow up needed: blood cultures from admission are finalized with no growth.   Other Healthcare Providers Involved in Patient s Care:         wound care     Update since discharge: improved. He is changing his dressings twice per day and things are gradually getting better.  The leg does not really cause him much pain. Does not think he's had any fevers or chills.  Eating and drinking well, no shortness of breath, chest pain, or abdominal upset.       Post Discharge Medication Reconciliation: discharge medications reconciled, continue medications without change.  Plan of care communicated with patient     Coding guidelines for this visit:  Type of Medical   Decision Making Face-to-Face Visit       within 7 Days of discharge Face-to-Face Visit        within 14 days of discharge   Moderate Complexity 52672  48743   High Complexity 40383 [id number]              Problem list and histories reviewed & adjusted, as indicated.  Additional history: none    Patient Active Problem List   Diagnosis     Cellulitis     Obesity, morbid, BMI 50 or higher (H)     Benign essential hypertension     Past Surgical History:   Procedure Laterality Date     APPENDECTOMY         Social History   Substance Use Topics     Smoking status: Never Smoker     Smokeless tobacco: Never Used     Alcohol use Yes      Comment: few times per month      History reviewed. No pertinent family history.        Reviewed and updated as needed this visit by clinical staff  Tobacco  Allergies  Meds  Problems  Med Hx  Surg Hx  Fam Hx  Soc Hx        Reviewed and updated as needed this visit by Provider  Tobacco  Allergies  Meds  Problems  Med Hx  Surg Hx  Fam Hx  Soc Hx          ROS:  Constitutional, cardiovascular, pulmonary, gi, and skin systems are negative, except as otherwise noted.    OBJECTIVE:                                                    /90 (BP Location: Right arm, Patient Position: Chair, Cuff Size: Adult Large)  Pulse 100  Temp 98  F (36.7  C) (Tympanic)  Wt (!) 532 lb (241.3 kg)  BMI 64.76 kg/m2  Body mass index is 64.76 kg/(m^2).   BP similar on my repeat     Gen: pleasant, well appearing, morbidly obese gentleman   CV: RRR, normal S1 and S2, no murmurs   Pulm: breathing comfortably, CTAB  Skin: erythema and mild warmth of RLE (patient reports redness has been improving), venous insufficiency, shallow ulceration of skin anterior and posterior with granulation tissue present.          ASSESSMENT/PLAN:                                                        1. Cellulitis of right lower extremity  Per patient is improving.  With his obesity and venous insufficiency skin will remain at risk.  For now complete course of augmentin. He has follow up with wound clinic 4/17.      2. Acute kidney injury (H)  Repeat electrolytes,  creatinine and LFTs today. A week out from discharge today likely close to baseline.   - Comprehensive metabolic panel (BMP + Alb, Alk Phos, ALT, AST, Total. Bili, TP)    3. Cutaneous fungal infection  Continue miconazole until rash completely resolved     4. Anemia, unspecified type  hct and MCV low inpatient. Repeat today along with iron studies.   - CBC with platelets  - Iron    5. Obesity, morbid, BMI 50 or higher (H)  Checked inpatient but lab was cancelled.   - Hemoglobin A1c    6. Benign essential hypertension  Had recent LAURO and hypokalemia, so amlodipine would be a good option but this can cause leg swelling.  Will repeat chemistry panel today before deciding on best medication.     Encouraged to sign up for mychart.    Follow up 4 weeks for HTN. Will plan to discuss obesity at that visit as well.         Lucy Stoner MD  [facility]

## 2017-04-07 ENCOUNTER — TELEPHONE (OUTPATIENT)
Dept: FAMILY MEDICINE | Facility: CLINIC | Age: 37
End: 2017-04-07

## 2017-04-07 DIAGNOSIS — I10 BENIGN ESSENTIAL HYPERTENSION: Primary | ICD-10-CM

## 2017-04-07 LAB
ALBUMIN SERPL-MCNC: 2.6 G/DL (ref 3.4–5)
ALP SERPL-CCNC: 65 U/L (ref 40–150)
ALT SERPL W P-5'-P-CCNC: 112 U/L (ref 0–70)
ANION GAP SERPL CALCULATED.3IONS-SCNC: 8 MMOL/L (ref 3–14)
AST SERPL W P-5'-P-CCNC: 35 U/L (ref 0–45)
BILIRUB SERPL-MCNC: 0.4 MG/DL (ref 0.2–1.3)
BUN SERPL-MCNC: 8 MG/DL (ref 7–30)
CALCIUM SERPL-MCNC: 8.6 MG/DL (ref 8.5–10.1)
CHLORIDE SERPL-SCNC: 105 MMOL/L (ref 94–109)
CO2 SERPL-SCNC: 27 MMOL/L (ref 20–32)
CREAT SERPL-MCNC: 0.87 MG/DL (ref 0.66–1.25)
GFR SERPL CREATININE-BSD FRML MDRD: ABNORMAL ML/MIN/1.7M2
GLUCOSE SERPL-MCNC: 94 MG/DL (ref 70–99)
IRON SERPL-MCNC: 39 UG/DL (ref 35–180)
POTASSIUM SERPL-SCNC: 3.9 MMOL/L (ref 3.4–5.3)
PROT SERPL-MCNC: 7.6 G/DL (ref 6.8–8.8)
SODIUM SERPL-SCNC: 140 MMOL/L (ref 133–144)

## 2017-04-07 RX ORDER — HYDROCHLOROTHIAZIDE 25 MG/1
25 TABLET ORAL DAILY
Qty: 30 TABLET | Refills: 1 | Status: SHIPPED | OUTPATIENT
Start: 2017-04-07 | End: 2017-07-10

## 2017-04-07 NOTE — TELEPHONE ENCOUNTER
Please call Drew regarding his lab results and blood pressure.  Thank you.    Your red blood cell count is mildly low and your iron level is on the low side of normal.  You may benefit from taking a multivitamin with iron or making sure to eat plenty of iron rich foods (lean red meat, beans, dark green leafy vegetables, fortified breads and cereals.)      Kidney function and electrolytes look back to normal.  Let's start hydrochlorothiazide 25mg daily for blood pressure; I have ordered this.     Liver enzymes are stable.  Total protein level is a little low, this may be due to acute illness.  We can repeat these labs in a few months.     He should follow up with me in 1 month for HTN, as discussed at our visit    Lucy Stoner MD

## 2017-04-07 NOTE — TELEPHONE ENCOUNTER
patient notified of provider message.  Gini Chadwick RN  [facility]  [phone]

## 2017-04-17 ENCOUNTER — HOSPITAL ENCOUNTER (OUTPATIENT)
Dept: WOUND CARE | Facility: CLINIC | Age: 37
Discharge: HOME | End: 2017-04-17
Attending: SURGERY | Admitting: SURGERY
Payer: COMMERCIAL

## 2017-04-17 DIAGNOSIS — I89.0 LYMPHEDEMA OF RIGHT LOWER EXTREMITY: Primary | ICD-10-CM

## 2017-04-17 DIAGNOSIS — L97.919 ULCER OF LOWER LIMB, RIGHT, WITH UNSPECIFIED SEVERITY (H): ICD-10-CM

## 2017-04-17 PROCEDURE — 99214 OFFICE O/P EST MOD 30 MIN: CPT | Mod: 25

## 2017-04-17 PROCEDURE — 99213 OFFICE O/P EST LOW 20 MIN: CPT | Mod: 25 | Performed by: SURGERY

## 2017-04-17 PROCEDURE — 11042 DBRDMT SUBQ TIS 1ST 20SQCM/<: CPT | Performed by: SURGERY

## 2017-04-17 PROCEDURE — 11042 DBRDMT SUBQ TIS 1ST 20SQCM/<: CPT

## 2017-04-17 PROCEDURE — 93923 UPR/LXTR ART STDY 3+ LVLS: CPT

## 2017-04-17 NOTE — PROGRESS NOTES
[facility] CONSULTATION       REQUESTING PHYSICIAN:  None stated.       DATE OF SERVICE:  04/17/2017       HISTORY OF PRESENT ILLNESS:  Drew Braun was referred to see us at the [facility] [place] from his recent hospitalization.  He has a history of chronic bilateral leg swelling.  This markedly worsened over the right leg over the last several months.  He had a methicillin-sensitive Staph aureus infection in the right leg in 03/2015.  The patient noticed marked worsening of the swelling in the right leg with pain and signs of sepsis.  He was found to have cellulitis.  He was admitted on 03/28/2017.  Cultures grew MSSA and E. coli.  He was given intravenous Zosyn for 3 days with improvement and started on Augmentin.  That was continued for 10 days after his discharge on 03/31/2017.      He reports that the swelling in his right leg is better but has not returned to baseline.  He is trying to wrap this with an Ace bandage.  It is difficult for him to apply to stay in place.  He finished off his oral Augmentin last week.  He is applying Neosporin dressings to the ulcerated areas on the anterior and medial calf region.      On 03/28/2017, CT scan revealed no evidence of infection in the lower extremity      On 04/06/2017, potassium 3.9, serum creatinine 0.87 (was up to 1.62 during his hospitalization due to the infection).  Hemoglobin A1c 5.5, glucose 94, white blood count 7300 (decreased), hemoglobin 11.0.      PAST MEDICAL HISTORY:     1.  Morbid obesity with a BMI of 65.  The patient needs to address this further.    a.  Pannus infection with fungus.  Treated with miconazole during his hospitalization, will continue with this.   2.  No history of diabetes, peripheral artery disease, cardiac problems.      Medications at discharge were multivitamins, Augmentin, miconazole.        SOCIAL HISTORY:  The patient is a nonsmoker.  He lives independently.  He has had a weight problem all of his life.         As mentioned both legs have been swollen but the right has significantly changed over the last several months.  He did have an infection in 2015 on the right leg with MSSA and now this more recent infection.  He has no history of venous insufficiency, though I have no records of a formal venous study ever being performed.      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  This does require that his legs are dependent.  He has never worn compression.  He has never been evaluated in the Lymphedema Clinic.      PHYSICAL EXAMINATION:   GENERAL:  Reveals a very pleasant, alert gentleman.   VITAL SIGNS:  Height 6 feet 4 inches, weight 550 pounds (241.3 kg), BMI 64.76 kg/m2.  Blood pressure 156/83, pulse 81, temperature 97.0.   CHEST:  Clear to auscultation.   CARDIOVASCULAR:  Regular rate.   EXTREMITIES:  He has evidence of morbid obesity.  His fungal infection appears to have resolved.  Both calves are swollen.  The left measures 52 cm and the right 83 cm.  Pedal pulses are difficult to palpate on the right due to swelling on the forefoot and a +3 left dorsalis pedis pulse was noted.      Bedside Doppler reveals a +3 triphasic signal in the dorsalis pedis and posterior tibial arteries bilaterally with no evidence of arterial insufficiency.      The right calf is quite swollen.  There are 2 areas of ulcerations.  Over the anterolateral this measures 8 x 4.9 cm with a depth of 0.1 cm and the posterior proximal medial 8.1 x 5.7 with a depth of 0.1 cm.  These are scattered areas within the measured sites where some areas have epithelialization that is very healthy.  There is thick fibrin and slough over these wounds.  There is no obvious cellulitis.  The induration is noted.      PROCEDURE:  Timeout was called and the sites were identified on the right calf with 4% topical lidocaine being applied.  A full thickness-subcutaneous excisional debridement was performed on both ulcerated areas to  remove the slough and fibrin down to healthier tissue and debriding the skin edges with no undermining (less than 20 cm were debrided).  He tolerated this with only mild discomfort.      Patient's insurance does not cover any advanced dressings and therefore, we reapplied the Neosporin to the area followed by gauze.  We then placed him in a SpandaGrip size G which should be easier for him to use than the Ace bandages.  He should change his dressings on a daily basis and wash the areas with soap and water.        IMPRESSION:  Significant cellulitis, right calf with secondary ulcerations.  He has adequate blood supply and this should be able to heal with time.  He will continue with the Neosporin and gauze and compression as described above.   1.  The patient has no evidence of arterial insufficiency.  With the chronic swelling, they have ruled out a DVT but we do not know his underlying venous function of deep venous insufficiency.  Eventually this will need to be evaluated.   2.  The patient certainly has lymphedema.  Compression will be necessary to control this and help prevent recurrent episodes of cellulitis.  Will refer him to the Lymphedema Clinic for further evaluation and recommendations.  We will see the patient back in 2-3 weeks for reevaluation.  We spent over 30 minutes with the patient today, with over 50% in counseling, discussing the situation.   3.  The patient does have morbid obesity.  This certainly does put him at risk for problems and now with the lymphedema, this worsened this.  Fortunately, he has no diabetes and normal renal function.  Once the cellulitis had been treated, no evidence of peripheral artery disease.  He may need to consider weight loss surgery to decrease risks in the future.         BRITTANI GONGORA MD             D: 2017 10:50   T: 2017 11:43   MT: mauro      Name:     JOMAR MCFADDEN   MRN:      [id number]        Account:      [id number]   :      1980            Visit Date:   04/17/2017      Document: [id number]

## 2017-05-08 ENCOUNTER — HOSPITAL ENCOUNTER (OUTPATIENT)
Dept: WOUND CARE | Facility: CLINIC | Age: 37
Discharge: HOME | End: 2017-05-08
Attending: SURGERY | Admitting: SURGERY
Payer: COMMERCIAL

## 2017-05-08 PROCEDURE — 11042 DBRDMT SUBQ TIS 1ST 20SQCM/<: CPT

## 2017-05-08 PROCEDURE — 99212 OFFICE O/P EST SF 10 MIN: CPT | Mod: 25 | Performed by: SURGERY

## 2017-05-08 PROCEDURE — 11042 DBRDMT SUBQ TIS 1ST 20SQCM/<: CPT | Performed by: SURGERY

## 2017-05-08 NOTE — PROGRESS NOTES
[facility]        REQUESTING PHYSICIAN:  None stated.       DATE OF SERVICE:  05/08/2017       Drew Braun returns to see us at the [facility] [place] today.  We saw him several weeks ago for multiple ulcerations on his right calf.  This was related to cellulitis with marked swelling.  The cellulitis responded to antibiotics which he had completed at his initial visit.  His ulceration measured 8 x 4.9 cm on the anterolateral right calf and 8.1 x 5.7 cm on the posterior proximal medial calf.  He has been applying Neosporin to this on a daily basis and using a SpandaGrip size D compression stocking.  He has noted continued improvement of the swelling and also a marked decrease in the wounds.      He does have a scheduled appointment with the Lymphedema Clinic within this next week.      PHYSICAL EXAMINATION:   GENERAL:  He is alert and appropriate.   VITAL SIGNS:  Blood pressure 155/79, pulse 74, temperature 96.5.   EXTREMITIES:  Swelling in the right leg is diminished.  There is no evidence of active cellulitis.  The ulcers have also significantly improved.  The right anterior lateral measures 1.5 x 0.8 cm with very minimal depth.  There is a small amount of scab located around the edges.  Granulation tissue appears healthy.  The right proximal posterior medial ulcer measured 0.7 x 0.5 cm, again with minimal depth and a mild amount of scab and fibrin.  There is no undermining.  Easily palpable +3 right posterior tibial pulse was noted.      PROCEDURE:  Timeout was called and sites were identified with 4% topical lidocaine being used to both ulcer sites.  With the aid of a #15 blade scalpel and a curet, a full thickness-subcutaneous excisional debridement was performed on the ulcers, removing all slough and fibrin, scab, and debriding the skin edges to 0.1 cm down to healthy bleeding tissue.  Granulation tissue is very robust with good bleeding following debridement.  He tolerated this with no  discomfort.  We applied a small amount of bacitracin ointment, gauze to the area and SpandaGrip.      IMPRESSION:   1.  Continued improvement of the ulcerations with significant decrease in size.  He will continue with the same dressing changes.  Due to his chronic lymphedema we feel that followup with the Lymphedema Clinic is very important to aid in chronic compression and to help control this problem.   2.  Morbid obesity.  He has been trying to decrease weight with diet.  We did discuss the importance of significant weight loss in this situation for long term morbidity (not only has lymphedema but also the possibility of arthritic changes in his joints and type 2 diabetes).  He is not on a scheduled weight loss program, though he has with dieticians in the past.  He plans to discuss this further with Dr. Stoner in a followup appointment.      We will plan to see him again in 2-3 weeks for reevaluation, likely his last visit at the wound clinic.         BRITTANI GONGORA MD             D: 2017 10:11   T: 2017 10:48   MT: mauro      Name:     JOMAR MCFADDEN   MRN:      [id number]        Account:      [id number]   :      1980           Visit Date:   2017      Document: [id number]       cc: Lucy Stoner MD

## 2017-05-15 ENCOUNTER — HOSPITAL ENCOUNTER (OUTPATIENT)
Dept: OCCUPATIONAL THERAPY | Facility: CLINIC | Age: 37
Setting detail: THERAPIES SERIES
End: 2017-05-15
Attending: PHYSICIAN ASSISTANT
Payer: COMMERCIAL

## 2017-05-15 PROCEDURE — 40000445 ZZHC STATISTIC OT VISIT, LYMPHEDEMA: Performed by: OCCUPATIONAL THERAPIST

## 2017-05-15 PROCEDURE — 97167 OT EVAL HIGH COMPLEX 60 MIN: CPT | Mod: GO | Performed by: OCCUPATIONAL THERAPIST

## 2017-05-15 PROCEDURE — 97535 SELF CARE MNGMENT TRAINING: CPT | Mod: GO | Performed by: OCCUPATIONAL THERAPIST

## 2017-05-15 NOTE — PROGRESS NOTES
"   05/15/17 0919   Rehab Discipline   Discipline OT   Type of Visit   Type of visit Initial Edema Evaluation   General Information   Start of care 05/15/17   Referring physician Ivon Hodges PA-C   Orders Evaluate and treat as indicated   Medical diagnosis Lymphedema, Ulcer R lower leg   Onset of illness / date of surgery 05/15/17   Edema etiology comments Morbid obesity (recent BMI cited in Epic MD notes was 65).   Pt first noted swelling in his legs for approx 1 yr; he managed this fairly well with elevation & movement until infection occured 3'2017.   He reports the swelling is primarily distal to the knees R > L.   He had first of two episodes of cellulitis approx 1 yr ago.   Pertinent history of current problem (PT: include personal factors and/or comorbidities that impact the POC; OT: include additional occupational profile info) Seen by Dr. Antonio at [facility] initially 4.17.17 for treatment of R calf ulcers s/p inpt hospitalization 3'2017 for cellulitis/sepsis.  During inpt admit pt dx'd pannus fungal infection & acute kidney injury (LAURO, anemia & pannus fungal infection now cleared).   Pt reports he has been self applying wound dressing to RBK ulcers; one pretibial and one calf and they are now healing well.   Pt is taking HCTZ but states this is for his HTN.   Surgical / medical history reviewed (Includes: HTN)   Edema special tests CAT  (Arterial dz r/o per Dr. 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   Fall Screening   Fall screen completed by OT   Is patient a fall risk? No   Fall screen comments \"I had a couple during the cellulitis infection, guess I had some vertigo or something.   I didn't get injured and " "nothing has happened since.\"   System Outcome Measures   Lymphedema Life Impact Scale (score range 0-72). A higher score indicates greater impairment. 14   Subjective Report   Patient report of symptoms Legs feel swollen, pt self csc re appearance of legs \"I wear long pants all the time\", does not know how to manage lymphedema, impaired fit of footwear   Pain   Patient currently in pain No   Cognitive Status   Orientation Orientation to person, place and time   Level of consciousness Alert   Follows commands and answers questions 100% of the time   Personal safety and judgement Intact   Memory Intact   Edema Exam / Assessment   Skin condition comments RBK 3+ pitting edema; soft tissues are firm/fibrotic but still pitting, deep hemosidarin staining, early stage 3 papilloma formation pretibial, scabbed lesions; one pretibial & one calf appear to be healing well.   Stemmer's sign was + B at 2nd toes, 3+ pitting edema LBK but < volume & hemosidarin staining vs RBK.   Pitting & incr appearance of skin pores inferior border of belly pannus.   Girth Measurements   Girth Measurements (LE msmts to be taken on initiation of CDT)   Activities of Daily Living   Activities of Daily Living Pt independent all ADLs/IADLs   Gait / Locomotion   Gait / Locomotion Independent all mobility, transfers & ambulation   Sensory   Sensory perception comments Some tingling in feet with sustained elevation of legs at work that resolves with legs in dependent position   Planned Edema Interventions   Planned edema interventions Manual lymph drainage;Gradient compression bandaging;Home management program development   Planned edema intervention comments Recommend intensive Complete Decongestive Therapy (CDT)   Clinical Impression   Criteria for skilled therapeutic intervention met Yes   Therapy diagnosis Bilateral below knee lymphedema likely secondary to obesity   Influenced by the following impairments / conditions Stage " 2;Phlebolymphedema;Wounds / Ulcers  (Stage 2 = not reversible with elevation & fibrotic)   Assessment of Occupational Performance 3-5 Performance Deficits   Identified Performance Deficits h/o cellulitis, h/o wounds, lacks knowledge re lymphedema mgmt, incr risk recurrent infection, early stage 3 skin changes, impaired fit of clothing & footwear, impaired body image   Clinical Decision Making (Complexity) High complexity   Treatment frequency (5 x wk x 2 wks)   Patient / family and/or staff in agreement with plan of care Yes   Risks and benefits of therapy have been explained Yes   Goal 1   Goal identifier LE volume   Goal description For decreased risk infection, skin breakdown/wounds & progressive soft-tissue fibrosis volume will be reduced at least 400ml RBK, and at least 300ml LBK.   Target date 07/28/17   Goal 2   Goal identifier GCB   Goal description To reduce volume of lymphedema and soft-tissue fibrosis pt will tolerate up to 23hr/day wear gradient compression bandaging (GCB) BBK.   Target date 07/28/17   Goal 3   Goal identifier Home program   Goal description For long-term home mgmt chronic lymphedema pt/caregiver independent in home program a. GCB/GCB alternative garment for night wear b. compression garment for day wear c. ex to incr lymph flow/self-MLD.   Target date 08/31/17   Goal 4   Goal identifier Lymphedema Precautions   Goal description For decreased risk infection and progressive soft-tissue fibrosis patient will verbalize understanding re lymphedema precautions.   Target date 07/28/17   Total Evaluation Time   Total evaluation time 20

## 2017-05-22 ENCOUNTER — HOSPITAL ENCOUNTER (OUTPATIENT)
Dept: WOUND CARE | Facility: CLINIC | Age: 37
Discharge: HOME | End: 2017-05-22
Attending: SURGERY | Admitting: SURGERY
Payer: COMMERCIAL

## 2017-05-22 PROCEDURE — 97597 DBRDMT OPN WND 1ST 20 CM/<: CPT | Performed by: SURGERY

## 2017-05-22 PROCEDURE — 97597 DBRDMT OPN WND 1ST 20 CM/<: CPT

## 2017-05-22 NOTE — PROGRESS NOTES
[facility]      DATE OF SERVICE:  05/22/2017       Mr. Drew Braun returns to see us today for the ulcerations on his right lower leg.  He has a history of morbid obesity and lymphedema (uncertain etiology).  He developed swelling and cellulitis causing the ulcerated areas.  This responded well to intravenous followed by oral antibiotics (which he has since completed) and edema control.        He is applying a small amount of Neosporin to the open areas which continue to decrease in size.  He is using a SpandaGrip size G on the leg for the swelling.      The patient has met with the L[facility]ema Clinic here in [place].  He is trying to work out a time with his work that he can go for the training sessions.  The patient is addressing his weight problem with his primary care physician.  He does work full-time at an office job, but it is somewhat difficult for him to elevate his legs.      PHYSICAL EXAMINATION:   GENERAL:  Alert and appropriate.   VITAL SIGNS:  Blood pressure 154/85, pulse 79 and temperature 97.5.   EXTREMITIES:  Evaluation of right leg reveals improvement of the swelling, though he is still edematous but more consistent with the contralateral side and not the amount of swelling he had immediately after the infection.  He has hemosiderin-type changes posteriorly.  The area of the ulceration has decreased and size measuring 5.5 x 2 cm with a depth of 0 cm.  Very healthy granulation tissue was noted with very minimal slough.  The ulcerated areas in the posterior calf have healed over.  A +3 dorsalis pedis pulse.      PROCEDURE:  Timeout was called and the sites were identified with 4% topical lidocaine being applied.  Using a #15 blade scalpel, a selective debridement was performed of the ulcer to remove the slough and cause some bleeding to encourage tissue ingrowth.  No significant debridement of the edges as necessary.  He tolerated this quite well.  Neosporin was placed over the area  followed by Band-Aid and a size G SpandaGrip.      IMPRESSION:  Continued improvement of right tibial ulcer related to cellulitis and lymphedema.  Swelling is slowly improving.  I do feel lymphedema education and wraps will be very beneficial.  This is going to be a chronic problem and he does need a long-term solution which is most likely the wraps.  Weight control will also be beneficial and is well aware of this.      He will continue with his Neosporin and dressing changes that he is doing daily along with the SpandaGrip until he starts the lymphedema wraps.  We will see him back in approximately 4 weeks for followup and likely will be healed at that time.         BRITTANI GONGORA MD             D: 2017 10:21   T: 2017 10:38   MT: GLO      Name:     JOMAR MCFADDEN   MRN:      [id number]        Account:      [id number]   :      1980           Visit Date:   2017      Document: [id number]

## 2017-06-12 ENCOUNTER — HOSPITAL ENCOUNTER (OUTPATIENT)
Dept: WOUND CARE | Facility: CLINIC | Age: 37
Discharge: HOME | End: 2017-06-12
Attending: SURGERY | Admitting: SURGERY
Payer: COMMERCIAL

## 2017-06-12 PROCEDURE — 99213 OFFICE O/P EST LOW 20 MIN: CPT | Performed by: SURGERY

## 2017-06-12 PROCEDURE — 99211 OFF/OP EST MAY X REQ PHY/QHP: CPT

## 2017-06-12 NOTE — PROGRESS NOTES
[facility]        DATE OF SERVICE:  06/12/2017       Drew Braun returns to see us at the [facility] for a 2-week followup.  He has a history of lymphedema of both of his legs and developed a significant cellulitis.  Developed ulcerations secondary to this.  He has excellent arterial blood supply.  He was treated initially with intravenous followed by oral antibiotics which he has completed.  The infection is completely resolved.  The wounds have continued to improve.      On his visit on 05/2/2017 the ulcer over the anterior right calf measured 5.5 x 2 cm with very minimal depth and excellent granulation tissue.  He had healed the ulcers in the posterior calf.  He is applying a small amount of Neosporin to the area followed by a gauze dressing and a SpandaGrip.      PHYSICAL EXAMINATION:   GENERAL:  Alert and appropriate.   VITAL SIGNS:  Blood pressure 153/86, pulse 76, temperature 97.6.   EXTREMITIES:  Evaluation of the right leg reveals an essentially healed anterior tibial ulcer.  There is a small scab in an area that measures 1 cm x 0.4 cm.  The surrounding skin looks excellent.  There was no need for debridement.  The rest of the skin looks very good with no weeping.  He does have the chronic lymphedema and hemosiderin-type changes but the skin is soft and supple.      Small amount in the bacitracin was applied to the area followed by a Band-Aid and a size G SpandaGrip (he is wearing this on the contralateral side also that had no specific problems except for the chronic lymphedema).      IMPRESSION:   1.  Essentially healed right tibial ulcers following acute cellulitis.  He will apply the Neosporin and a protective Band-Aid until the skin is matured.  Continue with the SpandaGrip.   2.  Chronic lymphedema.  He has been evaluated by the [facility] and accepted as a patient with them.  He is working out with his work requirements the necessary daily visits for a 2 weeks to  teach him lymphedema treatment for his chronic problem.   3.  Morbid obesity.  BMI 65 kg/m2 with a weight of 241.3 kilograms.  He has been discussing with Dr. Stoner various weight loss modalities.  He is aware that weight loss is very important for his overall long-term health and will continue to proceed with this.      Thank you very much for allowing us to be a part in the care of your patient at the [facility].  No specific further follow ups are scheduled with Mr. Braun.         BRITTANI GONGORA MD             D: 2017 09:27   T: 2017 11:17   MT: CD      Name:     JOMAR BRAUN   MRN:      [id number]        Account:      [id number]   :      1980           Visit Date:   2017      Document: [id number]       cc: Lucy Stoner MD

## 2017-06-13 ENCOUNTER — TELEPHONE (OUTPATIENT)
Dept: FAMILY MEDICINE | Facility: CLINIC | Age: 37
End: 2017-06-13

## 2017-06-13 NOTE — TELEPHONE ENCOUNTER
Attempted to call patient; no answer, voicemail not set up. Will attempt again later.   Adele Murphy, CMA

## 2017-06-13 NOTE — LETTER
[facility]  [address] [address]  [phone]        June 20, 2017  Drew Braun  [address] [address]    Dear Drew,    I care about your health and have reviewed your health plan. I have reviewed your medical conditions, medication list, and lab results and am making recommendations based on this review, to better manage your health.    You are in particular need of attention regarding:  -High Blood Pressure and obesity    I am recommending that you:  Schedule an appt with me to follow up  Here is a list of Health Maintenance topics that are due now or due soon:  Health Maintenance Due   Topic Date Due     Tetanus Vaccine - every 10 years  06/09/1999     Cholesterol Lab - every 5 years  06/09/2016       Please call us at [phone] (or use [email or web address]) to address the above recommendations.     Thank you for trusting [facility] and we appreciate the opportunity to serve you.  We look forward to supporting your healthcare needs in the future.    Healthy Regards,    Lucy Stoner MD

## 2017-07-10 DIAGNOSIS — I10 BENIGN ESSENTIAL HYPERTENSION: ICD-10-CM

## 2017-07-10 NOTE — TELEPHONE ENCOUNTER
HCTZ      Last Written Prescription Date: 4/7/17  Last Fill Quantity: 30, # refills: 1  Last Office Visit with [facility] or [facility] prescribing provider: 4/6/17       Potassium   Date Value Ref Range Status   04/06/2017 3.9 3.4 - 5.3 mmol/L Final     Creatinine   Date Value Ref Range Status   04/06/2017 0.87 0.66 - 1.25 mg/dL Final     BP Readings from Last 3 Encounters:   04/06/17 140/90   03/31/17 124/80     YANNA Ardon LPN

## 2017-07-10 NOTE — LETTER
[place] [id number] [place] [address]   [phone]      July 11, 2017    Drew Braun  [address] [place] [id number]            Dear Mr. Braun    Your physician requires an office visit in order to monitor your maintenance medication(s).  Your last office visit was on 4/6/17.  We have faxed to the pharmacy a 1 month refill of your medication(s) until you can be seen by your provider.  Please call the clinic at [phone] to schedule an appointment..        Sincerely,    [facility]

## 2017-07-11 RX ORDER — HYDROCHLOROTHIAZIDE 25 MG/1
TABLET ORAL
Qty: 30 TABLET | Refills: 0 | Status: SHIPPED | OUTPATIENT
Start: 2017-07-11

## 2017-07-11 NOTE — TELEPHONE ENCOUNTER
30 day supply sent. Needs OV prior to further refills. Letter was sent last month.     Lucy Stoner MD

## 2017-07-11 NOTE — TELEPHONE ENCOUNTER
Home phone # has a voicemail that has not been set up.  Letter mailed to pt again.Dante JOHNSON CMA

## 2017-07-11 NOTE — TELEPHONE ENCOUNTER
Routing refill request to provider for review/approval because:  Patient needs to be seen because:  Per last refill due for follow- unable to reach    Gini Chadwick RN  [facility]  [phone]

## 2017-07-17 NOTE — TELEPHONE ENCOUNTER
JOHN    Attempted to call pt again.  No answer and his voicemail still has not been set up.    Letter was mailed again to pt on 7/11/17.    Cindy Richard CMA

## 2017-08-10 NOTE — PROGRESS NOTES
"Outpatient Occupational Therapy Discharge Note     Patient: Jomar Mcfadden  : 1980  Insurance:   Payor/Plan Subscriber Name Rel Member # Group #   MEDICA - MEDICA CHOICE JOMAR MCFADDEN *  [id number]      [address] [address]       DATA: Pt referred to this program by Ivon Hodges PA-C for evaluation and treatment of BLE lymphedema likely secondary to morbid obesity.  Pt was evaluated by this writer 5.15.17 and recommendations were made for pt to present for a course of Complete Decongestive Therapy (CDT).   Rehab services support staff contacted pt 17 & 17 to schedule CDT; on both occasions pt stated he was \"working on it\" and would call back to schedule.   Pt has not phoned back to schedule; per  [facility] policy pt's are discharged s/p 3 months inactive episode of care.  ASSESSMENT: Unable to assess progress to goals or LLIS.  PLAN: Discharge Edema Treatment.  "

## 2019-03-09 ENCOUNTER — TELEPHONE (OUTPATIENT)
Dept: FAMILY MEDICINE | Facility: CLINIC | Age: 39
End: 2019-03-09

## 2019-03-09 NOTE — TELEPHONE ENCOUNTER
3/9/2019    Call Regarding Onboarding: [facility]    Attempt 2    Message left with patient     Comments: PT ON BOARDED      Outreach

## 2023-11-17 NOTE — TELEPHONE ENCOUNTER
Drew needs to make a follow up appointment with me for obesity and HTN. Would you help him to schedule this? Thank you!    Lucy Stoner MD    Problem: Chronic Conditions and Co-morbidities  Goal: Patient's chronic conditions and co-morbidity symptoms are monitored and maintained or improved  Outcome: Progressing     Problem: Pain  Goal: Verbalizes/displays adequate comfort level or baseline comfort level  Outcome: Progressing     Problem: Discharge Planning  Goal: Discharge to home or other facility with appropriate resources  Outcome: Progressing     Problem: Safety - Adult  Goal: Free from fall injury  Outcome: Progressing

## 2024-07-12 ENCOUNTER — HOSPITAL ENCOUNTER (EMERGENCY)
Facility: CLINIC | Age: 44
Discharge: HOME | End: 2024-07-13
Attending: STUDENT IN AN ORGANIZED HEALTH CARE EDUCATION/TRAINING PROGRAM
Payer: COMMERCIAL

## 2024-07-12 DIAGNOSIS — M41.9 SCOLIOSIS, UNSPECIFIED SCOLIOSIS TYPE, UNSPECIFIED SPINAL REGION: ICD-10-CM

## 2024-07-12 DIAGNOSIS — S09.90XA CLOSED HEAD INJURY, INITIAL ENCOUNTER: ICD-10-CM

## 2024-07-12 DIAGNOSIS — R16.1 SPLENOMEGALY: ICD-10-CM

## 2024-07-12 DIAGNOSIS — M51.9 LUMBAR DISC DISEASE: ICD-10-CM
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
## 2024-07-12 LAB
ALBUMIN SERPL BCG-MCNC: 3.9 G/DL (ref 3.5–5.2)
ALP SERPL-CCNC: 94 U/L (ref 40–150)
ALT SERPL W P-5'-P-CCNC: 14 U/L (ref 0–70)
ANION GAP SERPL CALCULATED.3IONS-SCNC: 9 MMOL/L (ref 7–15)
APTT PPP: 29 SECONDS (ref 22–38)
AST SERPL W P-5'-P-CCNC: 17 U/L (ref 0–45)
ATRIAL RATE - MUSE: 83 BPM
BASOPHILS # BLD AUTO: 0 10E3/UL (ref 0–0.2)
BASOPHILS NFR BLD AUTO: 0 %
BILIRUB SERPL-MCNC: 0.3 MG/DL
BUN SERPL-MCNC: 11.5 MG/DL (ref 6–20)
CALCIUM SERPL-MCNC: 9.3 MG/DL (ref 8.6–10)
CHLORIDE SERPL-SCNC: 101 MMOL/L (ref 98–107)
CREAT SERPL-MCNC: 0.92 MG/DL (ref 0.67–1.17)
DEPRECATED HCO3 PLAS-SCNC: 27 MMOL/L (ref 22–29)
DIASTOLIC BLOOD PRESSURE - MUSE: NORMAL MMHG
EGFRCR SERPLBLD CKD-EPI 2021: >90 ML/MIN/1.73M2
EOSINOPHIL # BLD AUTO: 0.2 10E3/UL (ref 0–0.7)
EOSINOPHIL NFR BLD AUTO: 2 %
ERYTHROCYTE [DISTWIDTH] IN BLOOD BY AUTOMATED COUNT: 14 % (ref 10–15)
GLUCOSE SERPL-MCNC: 121 MG/DL (ref 70–99)
HCT VFR BLD AUTO: 46.1 % (ref 40–53)
HGB BLD-MCNC: 15 G/DL (ref 13.3–17.7)
IMM GRANULOCYTES # BLD: 0.1 10E3/UL
IMM GRANULOCYTES NFR BLD: 1 %
INR PPP: 1.12 (ref 0.85–1.15)
INTERPRETATION ECG - MUSE: NORMAL
LYMPHOCYTES # BLD AUTO: 2.8 10E3/UL (ref 0.8–5.3)
LYMPHOCYTES NFR BLD AUTO: 29 %
MCH RBC QN AUTO: 27.2 PG (ref 26.5–33)
MCHC RBC AUTO-ENTMCNC: 32.5 G/DL (ref 31.5–36.5)
MCV RBC AUTO: 84 FL (ref 78–100)
MONOCYTES # BLD AUTO: 0.4 10E3/UL (ref 0–1.3)
MONOCYTES NFR BLD AUTO: 4 %
NEUTROPHILS # BLD AUTO: 6.2 10E3/UL (ref 1.6–8.3)
NEUTROPHILS NFR BLD AUTO: 64 %
NRBC # BLD AUTO: 0 10E3/UL
NRBC BLD AUTO-RTO: 0 /100
P AXIS - MUSE: 40 DEGREES
PLATELET # BLD AUTO: 202 10E3/UL (ref 150–450)
POTASSIUM SERPL-SCNC: 3.5 MMOL/L (ref 3.4–5.3)
PR INTERVAL - MUSE: 242 MS
PROT SERPL-MCNC: 7.5 G/DL (ref 6.4–8.3)
QRS DURATION - MUSE: 118 MS
QT - MUSE: 398 MS
QTC - MUSE: 467 MS
R AXIS - MUSE: -34 DEGREES
RBC # BLD AUTO: 5.52 10E6/UL (ref 4.4–5.9)
SODIUM SERPL-SCNC: 137 MMOL/L (ref 135–145)
SYSTOLIC BLOOD PRESSURE - MUSE: NORMAL MMHG
T AXIS - MUSE: 52 DEGREES
TROPONIN T SERPL HS-MCNC: 7 NG/L
VENTRICULAR RATE- MUSE: 83 BPM
WBC # BLD AUTO: 9.6 10E3/UL (ref 4–11)

## 2024-07-12 PROCEDURE — 36415 COLL VENOUS BLD VENIPUNCTURE: CPT | Performed by: STUDENT IN AN ORGANIZED HEALTH CARE EDUCATION/TRAINING PROGRAM

## 2024-07-12 PROCEDURE — 250N000013 HC RX MED GY IP 250 OP 250 PS 637: Performed by: STUDENT IN AN ORGANIZED HEALTH CARE EDUCATION/TRAINING PROGRAM

## 2024-07-12 PROCEDURE — 85025 COMPLETE CBC W/AUTO DIFF WBC: CPT | Performed by: STUDENT IN AN ORGANIZED HEALTH CARE EDUCATION/TRAINING PROGRAM

## 2024-07-12 PROCEDURE — 80053 COMPREHEN METABOLIC PANEL: CPT | Performed by: STUDENT IN AN ORGANIZED HEALTH CARE EDUCATION/TRAINING PROGRAM

## 2024-07-12 PROCEDURE — 85730 THROMBOPLASTIN TIME PARTIAL: CPT | Performed by: STUDENT IN AN ORGANIZED HEALTH CARE EDUCATION/TRAINING PROGRAM

## 2024-07-12 PROCEDURE — 99285 EMERGENCY DEPT VISIT HI MDM: CPT | Mod: 25

## 2024-07-12 PROCEDURE — 85610 PROTHROMBIN TIME: CPT | Performed by: STUDENT IN AN ORGANIZED HEALTH CARE EDUCATION/TRAINING PROGRAM

## 2024-07-12 PROCEDURE — 84484 ASSAY OF TROPONIN QUANT: CPT | Performed by: STUDENT IN AN ORGANIZED HEALTH CARE EDUCATION/TRAINING PROGRAM

## 2024-07-12 PROCEDURE — 250N000011 HC RX IP 250 OP 636: Performed by: STUDENT IN AN ORGANIZED HEALTH CARE EDUCATION/TRAINING PROGRAM

## 2024-07-12 PROCEDURE — 96374 THER/PROPH/DIAG INJ IV PUSH: CPT | Mod: 59

## 2024-07-12 PROCEDURE — 93005 ELECTROCARDIOGRAM TRACING: CPT

## 2024-07-12 PROCEDURE — 96375 TX/PRO/DX INJ NEW DRUG ADDON: CPT

## 2024-07-12 RX ORDER — ACETAMINOPHEN 500 MG
1000 TABLET ORAL ONCE
Status: COMPLETED | OUTPATIENT
Start: 2024-07-12 | End: 2024-07-12

## 2024-07-12 RX ORDER — LIDOCAINE 40 MG/G
CREAM TOPICAL
Status: DISCONTINUED | OUTPATIENT
Start: 2024-07-12 | End: 2024-07-13 | Stop reason: HOSPADM

## 2024-07-12 RX ORDER — ONDANSETRON 2 MG/ML
4 INJECTION INTRAMUSCULAR; INTRAVENOUS ONCE
Status: COMPLETED | OUTPATIENT
Start: 2024-07-12 | End: 2024-07-12

## 2024-07-12 RX ORDER — MORPHINE SULFATE 4 MG/ML
4 INJECTION, SOLUTION INTRAMUSCULAR; INTRAVENOUS ONCE
Status: COMPLETED | OUTPATIENT
Start: 2024-07-12 | End: 2024-07-12

## 2024-07-12 RX ADMIN — MORPHINE SULFATE 4 MG: 4 INJECTION, SOLUTION INTRAMUSCULAR; INTRAVENOUS at 23:44

## 2024-07-12 RX ADMIN — ONDANSETRON 4 MG: 2 INJECTION INTRAMUSCULAR; INTRAVENOUS at 23:44

## 2024-07-12 RX ADMIN — ACETAMINOPHEN 1000 MG: 500 TABLET, FILM COATED ORAL at 23:44

## 2024-07-12 ASSESSMENT — ACTIVITIES OF DAILY LIVING (ADL): ADLS_ACUITY_SCORE: 35

## 2024-07-13 ENCOUNTER — APPOINTMENT (OUTPATIENT)
Dept: CT IMAGING | Facility: CLINIC | Age: 44
End: 2024-07-13
Attending: STUDENT IN AN ORGANIZED HEALTH CARE EDUCATION/TRAINING PROGRAM
Payer: COMMERCIAL

## 2024-07-13 VITALS
RESPIRATION RATE: 18 BRPM | HEIGHT: 75 IN | HEART RATE: 86 BPM | WEIGHT: 315 LBS | OXYGEN SATURATION: 99 % | DIASTOLIC BLOOD PRESSURE: 87 MMHG | TEMPERATURE: 98.6 F | SYSTOLIC BLOOD PRESSURE: 142 MMHG | BODY MASS INDEX: 39.17 KG/M2

## 2024-07-13 LAB — HOLD SPECIMEN: NORMAL

## 2024-07-13 PROCEDURE — 999N000104 CT THORACIC SPINE RECONSTRUCTED

## 2024-07-13 PROCEDURE — 72125 CT NECK SPINE W/O DYE: CPT

## 2024-07-13 PROCEDURE — 250N000011 HC RX IP 250 OP 636: Performed by: STUDENT IN AN ORGANIZED HEALTH CARE EDUCATION/TRAINING PROGRAM

## 2024-07-13 PROCEDURE — 250N000013 HC RX MED GY IP 250 OP 250 PS 637: Performed by: STUDENT IN AN ORGANIZED HEALTH CARE EDUCATION/TRAINING PROGRAM

## 2024-07-13 PROCEDURE — 250N000009 HC RX 250: Performed by: STUDENT IN AN ORGANIZED HEALTH CARE EDUCATION/TRAINING PROGRAM

## 2024-07-13 PROCEDURE — 71260 CT THORAX DX C+: CPT

## 2024-07-13 PROCEDURE — 70450 CT HEAD/BRAIN W/O DYE: CPT

## 2024-07-13 PROCEDURE — 999N000104 CT LUMBAR SPINE RECONSTRUCTED

## 2024-07-13 RX ORDER — METHOCARBAMOL 500 MG/1
500 TABLET, FILM COATED ORAL 4 TIMES DAILY PRN
Qty: 20 TABLET | Refills: 0 | Status: SHIPPED | OUTPATIENT
Start: 2024-07-13

## 2024-07-13 RX ORDER — IOPAMIDOL 755 MG/ML
135 INJECTION, SOLUTION INTRAVASCULAR ONCE
Status: COMPLETED | OUTPATIENT
Start: 2024-07-13 | End: 2024-07-13

## 2024-07-13 RX ORDER — METHOCARBAMOL 500 MG/1
500 TABLET, FILM COATED ORAL ONCE
Status: COMPLETED | OUTPATIENT
Start: 2024-07-13 | End: 2024-07-13

## 2024-07-13 RX ADMIN — SODIUM CHLORIDE 79 ML: 9 INJECTION, SOLUTION INTRAVENOUS at 00:30

## 2024-07-13 RX ADMIN — IOPAMIDOL 135 ML: 755 INJECTION, SOLUTION INTRAVENOUS at 00:24

## 2024-07-13 RX ADMIN — METHOCARBAMOL 500 MG: 500 TABLET ORAL at 02:24

## 2024-07-13 ASSESSMENT — ACTIVITIES OF DAILY LIVING (ADL)
ADLS_ACUITY_SCORE: 35
ADLS_ACUITY_SCORE: 35
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
## 2024-09-21 ENCOUNTER — HEALTH MAINTENANCE LETTER (OUTPATIENT)
Age: 44
End: 2024-09-21

## 2025-02-06 NOTE — PROGRESS NOTES
[facility]    Hospitalist Progress Note    Date of Service (when I saw the patient): 03/30/2017    Assessment & Plan   Mr. Braun is a 36-year-old male with a past medical history of morbid obesity who presents to the Emergency Department with concerns for right lower extremity swelling and cellulitis.      1. Right lower extremity cellulitis: The patient has a history of methicillin-sensitive Staphylococcus aureus back in 2015. A CT does not show any evidence of abscess or gas bubbles to suggest necrosis or gangrene.  - Zosyn (day 3)  - Bld cx pending, wound cx growing gram positive and gram negative organisms  - Surgery consult appreciated, no intervention  - Wound RN consult appreciated  - note early venous congestion in veins of distal thigh likely contributing to presentation      2. Morbid obesity: BMI 65.  - Needs outpatient follow up for weight loss, PCP arranged by [facility]  - PT    3. Fungal infection of pannus:  - Appreciate wound consult, started on miconazole TID (day 2)     4. LAURO likely from #1: The patient's admit creatinine is 1.89 and the last creatinine I have access to was normal at 0.9 in 2013.   - Renal function improving, voiding freely  - UA questionable for UTI, Ucx pending  - Zosyn as above  - IVF NS at 100/hr, recheck labs in AM     5. Mildly elevated LFT's likely due to infection as above. Transaminase was normal about a decade ago, possibly related to fatty liver disease given morbid obesity. He is asymptomatic, no evidence of obstruction with normal alkaline phosphatase and bilirubin. He states only having 1-2 alcoholic beverages every few weeks.  - trending down, will recommend follow up in clinic.     6. Hypokalemia.  - replacing orally without protocol given LAURO.  - recheck this afternoon normal.    DVT Prophylaxis: Ambulate every shift  Code Status: Full Code    Disposition: Expected discharge in 1 day.    Selwyn Angel MD    Interval History   No new complaints.  Writer spoke to Jannette the medication naltrexone and bupropion is making her hyper like she is on adderall she would like to know if this is normal and then next she is suppose to increase the dose to twice daily and she is worried it will affect her sleep.      Alem Redman please advise.   Eating well, no leg pain, no fevers/chills.    -Data reviewed today: I reviewed all new labs and imaging results over the last 24 hours. I personally reviewed no images or EKG's today.    Physical Exam   Temp: 97.9  F (36.6  C) Temp src: Oral BP: 130/74 Pulse: 80 Heart Rate: 81 Resp: 18 SpO2: 94 % O2 Device: None (Room air)    Vitals:    03/29/17 0145   Weight: (!) 241.7 kg (532 lb 13.6 oz)     Vital Signs with Ranges  Temp:  [96.7  F (35.9  C)-99  F (37.2  C)] 97.9  F (36.6  C)  Pulse:  [80] 80  Heart Rate:  [81-85] 81  Resp:  [18-22] 18  BP: (118-131)/(68-74) 130/74  SpO2:  [94 %-95 %] 94 %  I/O last 3 completed shifts:  In: 1851 [P.O.:1320; I.V.:531]  Out: -     Constitutional: Awake, alert, cooperative, no apparent distress, morbidly obese.  Respiratory: Clear to auscultation bilaterally, no crackles or wheezing  Cardiovascular: Regular rate and rhythm, normal S1 and S2, and no murmur noted  GI: Normal bowel sounds, soft, non-distended, non-tender, difficult exam given habitus.  Skin/Integumen: RLE bandaged with dressings mildly soaked anteriorly and posteriorly to calf. Reduced erythema and warmth compared to yesterday. He does have early venous insufficiency in veins of distal thigh.    Medications     NaCl 100 mL/hr at 03/30/17 1424       piperacillin-tazobactam  3.375 g Intravenous Q6H     influenza quadrivalent (PF) vacc age 3 yrs and older  0.5 mL Intramuscular Prior to discharge     miconazole   Topical TID       Data     Recent Labs  Lab 03/30/17  1440 03/30/17  0805 03/29/17  0800 03/28/17  2245   WBC  --  12.9* 14.5* 16.3*   HGB  --  10.3* 10.9* 12.6*   MCV  --  73* 72* 72*   PLT  --  177 146* 173   NA  --  136 132* 132*   POTASSIUM 3.4 3.2* 3.2* 3.6   CHLORIDE  --  101 98 97   CO2  --  23 22 23   BUN  --  34* 40* 40*   CR  --  1.62* 1.88* 1.89*   ANIONGAP  --  12 12 12   DEBI  --  8.2* 8.3* 8.9   GLC  --  125* 118* 139*   ALBUMIN  --  2.2* 2.4* 2.7*   PROTTOTAL  --  6.9 7.3 8.3   BILITOTAL  --  0.7 0.7  0.8   ALKPHOS  --  66 76 89   ALT  --  80* 108* 137*   AST  --  95* 187* 286*       No results found for this or any previous visit (from the past 24 hour(s)).